# Patient Record
Sex: FEMALE | Race: WHITE | NOT HISPANIC OR LATINO | Employment: UNEMPLOYED | URBAN - METROPOLITAN AREA
[De-identification: names, ages, dates, MRNs, and addresses within clinical notes are randomized per-mention and may not be internally consistent; named-entity substitution may affect disease eponyms.]

---

## 2024-01-17 ENCOUNTER — PHARMACY VISIT (OUTPATIENT)
Dept: PHARMACY | Facility: CLINIC | Age: 34
End: 2024-01-17
Payer: MEDICAID

## 2024-01-17 ENCOUNTER — HOSPITAL ENCOUNTER (EMERGENCY)
Facility: HOSPITAL | Age: 34
Discharge: HOME | End: 2024-01-17
Attending: EMERGENCY MEDICINE
Payer: COMMERCIAL

## 2024-01-17 ENCOUNTER — APPOINTMENT (OUTPATIENT)
Dept: RADIOLOGY | Facility: HOSPITAL | Age: 34
End: 2024-01-17
Payer: COMMERCIAL

## 2024-01-17 VITALS
HEIGHT: 70 IN | RESPIRATION RATE: 16 BRPM | DIASTOLIC BLOOD PRESSURE: 130 MMHG | OXYGEN SATURATION: 93 % | WEIGHT: 293 LBS | SYSTOLIC BLOOD PRESSURE: 176 MMHG | TEMPERATURE: 97 F | BODY MASS INDEX: 41.95 KG/M2 | HEART RATE: 101 BPM

## 2024-01-17 DIAGNOSIS — M54.2 NECK PAIN: Primary | ICD-10-CM

## 2024-01-17 LAB
ANION GAP SERPL CALC-SCNC: 11 MMOL/L (ref 10–20)
BASOPHILS # BLD AUTO: 0.07 X10*3/UL (ref 0–0.1)
BASOPHILS NFR BLD AUTO: 0.8 %
BUN SERPL-MCNC: 9 MG/DL (ref 6–23)
CALCIUM SERPL-MCNC: 9.2 MG/DL (ref 8.6–10.3)
CHLORIDE SERPL-SCNC: 103 MMOL/L (ref 98–107)
CO2 SERPL-SCNC: 26 MMOL/L (ref 21–32)
CREAT SERPL-MCNC: 0.69 MG/DL (ref 0.5–1.05)
EGFRCR SERPLBLD CKD-EPI 2021: >90 ML/MIN/1.73M*2
EOSINOPHIL # BLD AUTO: 0.3 X10*3/UL (ref 0–0.7)
EOSINOPHIL NFR BLD AUTO: 3.4 %
ERYTHROCYTE [DISTWIDTH] IN BLOOD BY AUTOMATED COUNT: 12.3 % (ref 11.5–14.5)
GLUCOSE SERPL-MCNC: 102 MG/DL (ref 74–99)
HCG UR QL IA.RAPID: NEGATIVE
HCT VFR BLD AUTO: 43.9 % (ref 36–46)
HGB BLD-MCNC: 15.3 G/DL (ref 12–16)
IMM GRANULOCYTES # BLD AUTO: 0.05 X10*3/UL (ref 0–0.7)
IMM GRANULOCYTES NFR BLD AUTO: 0.6 % (ref 0–0.9)
LYMPHOCYTES # BLD AUTO: 2.16 X10*3/UL (ref 1.2–4.8)
LYMPHOCYTES NFR BLD AUTO: 24.8 %
MCH RBC QN AUTO: 31.7 PG (ref 26–34)
MCHC RBC AUTO-ENTMCNC: 34.9 G/DL (ref 32–36)
MCV RBC AUTO: 91 FL (ref 80–100)
MONOCYTES # BLD AUTO: 0.63 X10*3/UL (ref 0.1–1)
MONOCYTES NFR BLD AUTO: 7.2 %
NEUTROPHILS # BLD AUTO: 5.49 X10*3/UL (ref 1.2–7.7)
NEUTROPHILS NFR BLD AUTO: 63.2 %
NRBC BLD-RTO: 0 /100 WBCS (ref 0–0)
PLATELET # BLD AUTO: 318 X10*3/UL (ref 150–450)
POTASSIUM SERPL-SCNC: 4.3 MMOL/L (ref 3.5–5.3)
RBC # BLD AUTO: 4.82 X10*6/UL (ref 4–5.2)
SODIUM SERPL-SCNC: 136 MMOL/L (ref 136–145)
WBC # BLD AUTO: 8.7 X10*3/UL (ref 4.4–11.3)

## 2024-01-17 PROCEDURE — 99284 EMERGENCY DEPT VISIT MOD MDM: CPT | Performed by: EMERGENCY MEDICINE

## 2024-01-17 PROCEDURE — RXMED WILLOW AMBULATORY MEDICATION CHARGE

## 2024-01-17 PROCEDURE — 36415 COLL VENOUS BLD VENIPUNCTURE: CPT | Performed by: EMERGENCY MEDICINE

## 2024-01-17 PROCEDURE — 96374 THER/PROPH/DIAG INJ IV PUSH: CPT

## 2024-01-17 PROCEDURE — 72125 CT NECK SPINE W/O DYE: CPT

## 2024-01-17 PROCEDURE — 81025 URINE PREGNANCY TEST: CPT | Performed by: EMERGENCY MEDICINE

## 2024-01-17 PROCEDURE — 2500000004 HC RX 250 GENERAL PHARMACY W/ HCPCS (ALT 636 FOR OP/ED): Performed by: EMERGENCY MEDICINE

## 2024-01-17 PROCEDURE — 85025 COMPLETE CBC W/AUTO DIFF WBC: CPT | Performed by: EMERGENCY MEDICINE

## 2024-01-17 PROCEDURE — 80051 ELECTROLYTE PANEL: CPT | Performed by: EMERGENCY MEDICINE

## 2024-01-17 PROCEDURE — 72125 CT NECK SPINE W/O DYE: CPT | Performed by: RADIOLOGY

## 2024-01-17 RX ORDER — OXYCODONE AND ACETAMINOPHEN 5; 325 MG/1; MG/1
1 TABLET ORAL ONCE
Status: DISCONTINUED | OUTPATIENT
Start: 2024-01-17 | End: 2024-01-18 | Stop reason: HOSPADM

## 2024-01-17 RX ORDER — KETOROLAC TROMETHAMINE 30 MG/ML
15 INJECTION, SOLUTION INTRAMUSCULAR; INTRAVENOUS ONCE
Status: COMPLETED | OUTPATIENT
Start: 2024-01-17 | End: 2024-01-17

## 2024-01-17 RX ORDER — MORPHINE SULFATE 4 MG/ML
4 INJECTION, SOLUTION INTRAMUSCULAR; INTRAVENOUS ONCE
Status: DISCONTINUED | OUTPATIENT
Start: 2024-01-17 | End: 2024-01-17

## 2024-01-17 RX ORDER — CYCLOBENZAPRINE HCL 10 MG
10 TABLET ORAL 3 TIMES DAILY PRN
Qty: 15 TABLET | Refills: 0 | Status: SHIPPED | OUTPATIENT
Start: 2024-01-17 | End: 2024-06-07 | Stop reason: HOSPADM

## 2024-01-17 RX ORDER — ORPHENADRINE CITRATE 30 MG/ML
60 INJECTION INTRAMUSCULAR; INTRAVENOUS ONCE
Status: DISCONTINUED | OUTPATIENT
Start: 2024-01-17 | End: 2024-01-18 | Stop reason: HOSPADM

## 2024-01-17 RX ADMIN — KETOROLAC TROMETHAMINE 15 MG: 30 INJECTION, SOLUTION INTRAMUSCULAR at 19:20

## 2024-01-17 ASSESSMENT — PAIN - FUNCTIONAL ASSESSMENT
PAIN_FUNCTIONAL_ASSESSMENT: 0-10
PAIN_FUNCTIONAL_ASSESSMENT: 0-10

## 2024-01-17 ASSESSMENT — PAIN DESCRIPTION - PROGRESSION: CLINICAL_PROGRESSION: NOT CHANGED

## 2024-01-17 ASSESSMENT — LIFESTYLE VARIABLES
HAVE YOU EVER FELT YOU SHOULD CUT DOWN ON YOUR DRINKING: NO
REASON UNABLE TO ASSESS: NO
HAVE PEOPLE ANNOYED YOU BY CRITICIZING YOUR DRINKING: NO
EVER HAD A DRINK FIRST THING IN THE MORNING TO STEADY YOUR NERVES TO GET RID OF A HANGOVER: NO
EVER FELT BAD OR GUILTY ABOUT YOUR DRINKING: NO

## 2024-01-17 ASSESSMENT — PAIN DESCRIPTION - LOCATION: LOCATION: NECK

## 2024-01-17 ASSESSMENT — PAIN DESCRIPTION - DESCRIPTORS: DESCRIPTORS: SHARP

## 2024-01-17 ASSESSMENT — PAIN SCALES - GENERAL
PAINLEVEL_OUTOF10: 9
PAINLEVEL_OUTOF10: 9

## 2024-01-17 NOTE — ED PROVIDER NOTES
HPI   Chief Complaint   Patient presents with    Neck Pain     X 4 days no known  injury       HPI       HISTORY OF PRESENT ILLNESS:  33-year-old female presents emergency department for neck pain.  History is significant for prior thoracic outlet syndrome with neck surgery to remove the extra rib.  Patient states that 4 days ago, while she was sitting down, she had sudden onset of neck pain in the right side of her neck.  She noted today that started to radiate a down the lateral humerus region.  Associated with right-sided headache.  The patient denies any current chest pain, nausea, vomiting,    Denied any numbness, tingling, arm weakness but is painful to move her right arm.    Past Medical History: Thoracic outlet syndrome  Past Surgical History: Thoracic outlet syndrome surgery   Family History: family history not pertinent to presenting problem or chief complaint  Social History:  denies cigarette smoking or EtOH use    __________________________________________________________  PHYSICAL EXAM:    Appearance: Appears stated age  female, alert oriented , cooperative   Skin: Intact,  dry skin, no lesions, rash, petechiae or purpura.   Eyes: PERRLA, EOMs intact,  Conjunctiva pink with no redness or exudates.    HENT: Normocephalic, atraumatic. Nares patent   Neck: Supple. Trachea at midline.   Pulmonary: Lung sounds are clear bilaterally.  There is no rales, rhonchi, or wheezing.  Cardiac: Regular rate and rhythm, no rubs, murmurs, or gallops. No JVD,   Abdomen: Abdomen is soft, nontender, and nondistended.  No palpable organomegaly.  No rebound or guarding.  No CVA tenderness. Nonsurgical abdomen  Genitourinary: Exam deferred.  Musculoskeletal: There is some midline C-spine tenderness and the mid C-spine region.  R trapezius also tender to superficial touch. No step-off.  No edema, pain, cyanosis, or deformity in extremities. Pulses full and equal.   Neurological:  Cranial nerves are grossly intact,  grossly normal sensation, no weakness, no focal findings identified.  Patient did not have any numbness with a upper or lower extremity.  Motor was intact.    __________________________________________________________  MEDICAL DECISION MAKING:    Patient was seen and examined. Differential diagnosis for right-sided neck pain includes musculoskeletal strain, surgical complication, occult fracture or dislocation.  Patient has a history significant for a thoracic outlet syndrome status post surgery.  She has had 4 days of atraumatic right-sided neck pain.  The patient denies any manipulation of the neck or trauma.  On physical exam, she is tender on palpation to musculature.  She denied any fever, nausea or vomiting.  There was an associated headache which was not thunderclap in nature.  Based on her presentation, I did not believe that this was a meningitis.  Given the onset of pain, I will obtain a CT scan in addition to basic labs.  Patient vital signs did not suggest signs of infection consistent with a meningitis.    Patient labs show no evidence of leukocytosis concerning for infection.  CT imaging was obtained which was negative for fracture or dislocation.  There was x-ray sinus fluid, which the patient states she has a history of sinusitis and that is not currently active at this time requiring treatment.  She did refuse my initial dose of muscle relaxant in addition to pain medication.  I ultimately did give her a dose of Toradol.  The patient felt comfortable at this point was discharged with return precautions for any neurodeficits or weakness, follow-up with her surgeon, and supportive care instructions.  Patient was provided prescription of muscle relaxants.  She verbalized understanding, agreed to plan the patient was discharged home.    Chronic Medical Conditions Significantly Affecting Care: Thoracic outlet syndrome    Emile Barajas  Emergency Medicine               [place] Coma Scale Score: 15                   Patient History   No past medical history on file.  Past Surgical History:   Procedure Laterality Date    ADENOIDECTOMY  06/30/2016    Adenoidectomy    OTHER SURGICAL HISTORY  06/30/2016    Abdominal Surgery    TONSILLECTOMY  06/30/2016    Tonsillectomy     No family history on file.  Social History     Tobacco Use    Smoking status: Not on file    Smokeless tobacco: Not on file   Substance Use Topics    Alcohol use: Not on file    Drug use: Not on file       Physical Exam   ED Triage Vitals [01/17/24 1729]   Temp Heart Rate Resp BP   36.1 °C (97 °F) 101 16 (!) 176/130      SpO2 Temp src Heart Rate Source Patient Position   93 % -- Monitor Sitting      BP Location FiO2 (%)     Left arm --       Physical Exam    ED Course & MDM   Diagnoses as of 01/18/24 0145   Neck pain       Medical Decision Making      Procedure  Procedures     Emile Barajas,   01/18/24 0155

## 2024-02-13 ENCOUNTER — LAB (OUTPATIENT)
Dept: LAB | Facility: LAB | Age: 34
End: 2024-02-13
Payer: COMMERCIAL

## 2024-02-13 ENCOUNTER — OFFICE VISIT (OUTPATIENT)
Dept: PRIMARY CARE | Facility: CLINIC | Age: 34
End: 2024-02-13
Payer: COMMERCIAL

## 2024-02-13 ENCOUNTER — OFFICE VISIT (OUTPATIENT)
Dept: OBSTETRICS AND GYNECOLOGY | Facility: CLINIC | Age: 34
End: 2024-02-13
Payer: COMMERCIAL

## 2024-02-13 VITALS
DIASTOLIC BLOOD PRESSURE: 100 MMHG | HEART RATE: 89 BPM | TEMPERATURE: 98.2 F | SYSTOLIC BLOOD PRESSURE: 134 MMHG | HEIGHT: 70 IN | BODY MASS INDEX: 41.95 KG/M2 | WEIGHT: 293 LBS | OXYGEN SATURATION: 98 %

## 2024-02-13 VITALS
BODY MASS INDEX: 41.02 KG/M2 | WEIGHT: 293 LBS | DIASTOLIC BLOOD PRESSURE: 88 MMHG | SYSTOLIC BLOOD PRESSURE: 152 MMHG | HEIGHT: 71 IN

## 2024-02-13 DIAGNOSIS — N92.0 MENORRHAGIA WITH REGULAR CYCLE: ICD-10-CM

## 2024-02-13 DIAGNOSIS — R63.5 WEIGHT GAIN: ICD-10-CM

## 2024-02-13 DIAGNOSIS — R03.0 ELEVATED BLOOD PRESSURE READING: ICD-10-CM

## 2024-02-13 DIAGNOSIS — N94.6 DYSMENORRHEA: ICD-10-CM

## 2024-02-13 DIAGNOSIS — R63.5 WEIGHT GAIN: Primary | ICD-10-CM

## 2024-02-13 DIAGNOSIS — R10.2 PELVIC PAIN: Primary | ICD-10-CM

## 2024-02-13 PROBLEM — R00.0 TACHYCARDIA: Status: ACTIVE | Noted: 2021-08-19

## 2024-02-13 PROBLEM — G54.0: Status: RESOLVED | Noted: 2022-07-05 | Resolved: 2024-02-13

## 2024-02-13 PROBLEM — N80.9 ENDOMETRIOSIS: Status: ACTIVE | Noted: 2021-08-19

## 2024-02-13 PROBLEM — G43.009 MIGRAINE WITHOUT AURA AND WITHOUT STATUS MIGRAINOSUS, NOT INTRACTABLE: Status: ACTIVE | Noted: 2021-10-13

## 2024-02-13 PROBLEM — N60.19 FIBROCYSTIC BREAST CHANGES: Status: ACTIVE | Noted: 2021-08-19

## 2024-02-13 PROBLEM — R55 SYNCOPE AND COLLAPSE: Status: RESOLVED | Noted: 2021-07-27 | Resolved: 2024-02-13

## 2024-02-13 PROBLEM — M54.2 NECK PAIN ON RIGHT SIDE: Status: ACTIVE | Noted: 2021-10-14

## 2024-02-13 PROBLEM — I77.89: Status: RESOLVED | Noted: 2022-07-05 | Resolved: 2024-02-13

## 2024-02-13 LAB
ALBUMIN SERPL BCP-MCNC: 4.3 G/DL (ref 3.4–5)
ALP SERPL-CCNC: 62 U/L (ref 33–110)
ALT SERPL W P-5'-P-CCNC: 15 U/L (ref 7–45)
ANION GAP SERPL CALC-SCNC: 10 MMOL/L (ref 10–20)
AST SERPL W P-5'-P-CCNC: 13 U/L (ref 9–39)
BILIRUB SERPL-MCNC: 0.3 MG/DL (ref 0–1.2)
BUN SERPL-MCNC: 11 MG/DL (ref 6–23)
CALCIUM SERPL-MCNC: 9 MG/DL (ref 8.6–10.3)
CHLORIDE SERPL-SCNC: 106 MMOL/L (ref 98–107)
CHOLEST SERPL-MCNC: 184 MG/DL (ref 0–199)
CHOLESTEROL/HDL RATIO: 3.5
CO2 SERPL-SCNC: 26 MMOL/L (ref 21–32)
CREAT SERPL-MCNC: 0.73 MG/DL (ref 0.5–1.05)
EGFRCR SERPLBLD CKD-EPI 2021: >90 ML/MIN/1.73M*2
EST. AVERAGE GLUCOSE BLD GHB EST-MCNC: 105 MG/DL
GLUCOSE SERPL-MCNC: 98 MG/DL (ref 74–99)
HBA1C MFR BLD: 5.3 %
HDLC SERPL-MCNC: 52.1 MG/DL
LDLC SERPL CALC-MCNC: 101 MG/DL
NON HDL CHOLESTEROL: 132 MG/DL (ref 0–149)
POTASSIUM SERPL-SCNC: 4.4 MMOL/L (ref 3.5–5.3)
PROT SERPL-MCNC: 6.9 G/DL (ref 6.4–8.2)
SODIUM SERPL-SCNC: 138 MMOL/L (ref 136–145)
T4 FREE SERPL-MCNC: 0.82 NG/DL (ref 0.61–1.12)
TRIGL SERPL-MCNC: 156 MG/DL (ref 0–149)
TSH SERPL-ACNC: 4.97 MIU/L (ref 0.44–3.98)
VLDL: 31 MG/DL (ref 0–40)

## 2024-02-13 PROCEDURE — 99203 OFFICE O/P NEW LOW 30 MIN: CPT | Performed by: NURSE PRACTITIONER

## 2024-02-13 PROCEDURE — 80053 COMPREHEN METABOLIC PANEL: CPT

## 2024-02-13 PROCEDURE — 84439 ASSAY OF FREE THYROXINE: CPT

## 2024-02-13 PROCEDURE — 3008F BODY MASS INDEX DOCD: CPT | Performed by: NURSE PRACTITIONER

## 2024-02-13 PROCEDURE — 83036 HEMOGLOBIN GLYCOSYLATED A1C: CPT

## 2024-02-13 PROCEDURE — 80061 LIPID PANEL: CPT

## 2024-02-13 PROCEDURE — 36415 COLL VENOUS BLD VENIPUNCTURE: CPT

## 2024-02-13 PROCEDURE — 84443 ASSAY THYROID STIM HORMONE: CPT

## 2024-02-13 RX ORDER — BISMUTH SUBSALICYLATE 262 MG
2 TABLET,CHEWABLE ORAL DAILY
COMMUNITY

## 2024-02-13 ASSESSMENT — ENCOUNTER SYMPTOMS
DIFFICULTY URINATING: 1
DIARRHEA: 1
COUGH: 0
DIFFICULTY URINATING: 0
EYE DISCHARGE: 0
DYSURIA: 0
SINUS PRESSURE: 0
NERVOUS/ANXIOUS: 0
ADENOPATHY: 0
DIZZINESS: 0
FATIGUE: 1
BACK PAIN: 1
EYE ITCHING: 1
NAUSEA: 0
ABDOMINAL PAIN: 0
PALPITATIONS: 0
NUMBNESS: 0
CONSTIPATION: 1
CHILLS: 0
POLYDIPSIA: 0
CONSTITUTIONAL NEGATIVE: 1
HEADACHES: 1
NECK PAIN: 1
BRUISES/BLEEDS EASILY: 1
RESPIRATORY NEGATIVE: 1
SHORTNESS OF BREATH: 0
HEMATURIA: 0
WEAKNESS: 0
EYE PAIN: 0
VOMITING: 0
FREQUENCY: 0
PHOTOPHOBIA: 0
EYE REDNESS: 0
SORE THROAT: 0
WHEEZING: 0
ARTHRALGIAS: 0
UNEXPECTED WEIGHT CHANGE: 0
RHINORRHEA: 0
FEVER: 0
SPEECH DIFFICULTY: 0
POLYPHAGIA: 0
SLEEP DISTURBANCE: 0
DYSPHORIC MOOD: 0
CHEST TIGHTNESS: 0
BLOOD IN STOOL: 0
PSYCHIATRIC NEGATIVE: 1
MYALGIAS: 1

## 2024-02-13 ASSESSMENT — PATIENT HEALTH QUESTIONNAIRE - PHQ9
SUM OF ALL RESPONSES TO PHQ9 QUESTIONS 1 AND 2: 0
1. LITTLE INTEREST OR PLEASURE IN DOING THINGS: NOT AT ALL
2. FEELING DOWN, DEPRESSED OR HOPELESS: NOT AT ALL

## 2024-02-13 NOTE — PROGRESS NOTES
Subjective   Patient ID: Tracie Malagon is a 33 y.o. female who presents for New Patient Visit (Patient states she has a large fibroid that was first found on ultrasound 6/10/2021. States it had doubled in size in a year and a half. States she is having issues voiding. Heavier, painful periods ).  33 year old here for complaints of having heavy and painful periods as well as ongoing pressure.  She states that she has had heavy and painful periods for years and was informed she had a fibroid in her uterus in 2021.  She then had the fibroid rechecked in March 2023.  That report states uterine fibroid present which appears to have increased in size, but no measurements were provided on the typed report.  Per patient the fibroid doubled in size from 2021 to 2023.  She states that she has continued to have heavy and painful periods.  She is now filling her menstrual cup every hour and passing clots quarter size to a golf ball size every time she changes her cup.  She is also wearing pads to help with overflow.  She works from home, but states she would be unable to leave while she is on her menses due to the heaviness.  She also is having extreme cramping with her cycle.  She has also started to notice pain and difficulty with urination.  She feels she has to strain greatly to be able to urinate despite having the urge to go.  She states once she pushes hard enough the urine does come out.  She denies any burning or pain with urination.  She states that she is also feeling like she has something in her stomach at all times like she is carrying a baby without being pregnant.  She feels the pressure when she is sitting or bends over.  She states that she does not want to feel like this every day and has come to terms with not having children.          Review of Systems   Constitutional: Negative.    Respiratory: Negative.     Genitourinary:  Positive for difficulty urinating, pelvic pain and vaginal bleeding.   Skin:  Negative.    Psychiatric/Behavioral: Negative.         Objective   Physical Exam  Vitals reviewed.   Constitutional:       Appearance: Normal appearance. She is well-developed.   Pulmonary:      Effort: Pulmonary effort is normal. No respiratory distress.   Abdominal:      Palpations: Abdomen is soft.   Genitourinary:     Comments: Patient declined pelvic at this time  Musculoskeletal:         General: Normal range of motion.   Skin:     General: Skin is warm and dry.   Neurological:      General: No focal deficit present.      Mental Status: She is alert and oriented to person, place, and time. Mental status is at baseline.   Psychiatric:         Attention and Perception: Attention and perception normal.         Mood and Affect: Mood and affect normal.         Speech: Speech normal.         Behavior: Behavior normal. Behavior is cooperative.         Thought Content: Thought content normal.         Judgment: Judgment normal.         Assessment/Plan   Problem List Items Addressed This Visit             ICD-10-CM    Menorrhagia with regular cycle N92.0    Relevant Orders    US PELVIS TRANSABDOMINAL WITH TRANSVAGINAL    Dysmenorrhea N94.6     Other Visit Diagnoses         Codes    Pelvic pain    -  Primary R10.2    Relevant Orders    US PELVIS TRANSABDOMINAL WITH TRANSVAGINAL          Pelvic ultrasound ordered, reviewed pending results can refer to surgeon for intervention  Records release to obtain records from previous ob/gyn  Return for annual exam/pap       CATA Husain-CNP 02/13/24 5:18 PM

## 2024-02-13 NOTE — PATIENT INSTRUCTIONS
Obtain the fasting labs as ordered today.  Check home BP readings.   Referral to GYN.  Follow up in 3 months to recheck BP.

## 2024-02-13 NOTE — PROGRESS NOTES
Subjective    Tracie Malagon is a 33 y.o. female who presents for New Patient Visit (Establish care with Laura - Wants to talk about her concern with her periods and wants to talk about family history).    HPI  She presents to the office today to establish care, discuss weight gain, fibroids.  Work 3pm-midnight  Wakes up at noon  Diet: Breakfast around noon Coffee- sugar free oat milk.   Yogurt- generally eats a light breakfast or gets nauseated.  Snack- carrots  Lunchat 7 pm: low carb wrap with tuna.    Exercise: Cardio- treadmill walk/run 2 miles  Bikes about 30 minutes and weightlifting  Generally goes 5 days a week    Has gained 36 lbs in the past 6 weeks      Periods are regular occurring every 28 days  Duration every 5-9 day  Cramps and heavy bleeding.  (+) pain in the lower back and lower abdomen.  Uses a menstrual cup- on first 3 days it fills with blood and clots every 45 minutes  (+) quarter sized clots  Has known fibroid which has grown in size and bleeding is heavier now.       Review of Systems   Constitutional:  Positive for fatigue. Negative for chills, fever and unexpected weight change.   HENT:  Positive for hearing loss, nosebleeds and postnasal drip. Negative for congestion, ear pain, rhinorrhea, sinus pressure, sore throat and tinnitus.    Eyes:  Positive for itching. Negative for photophobia, pain, discharge, redness and visual disturbance.   Respiratory:  Negative for cough, chest tightness, shortness of breath and wheezing.    Cardiovascular:  Negative for chest pain, palpitations and leg swelling.   Gastrointestinal:  Positive for constipation and diarrhea. Negative for abdominal pain, blood in stool, nausea and vomiting.   Endocrine: Negative for cold intolerance, heat intolerance, polydipsia, polyphagia and polyuria.   Genitourinary:  Negative for difficulty urinating, dysuria, frequency, hematuria and urgency.   Musculoskeletal:  Positive for back pain, myalgias and neck pain.  "Negative for arthralgias.        Constipated until around period   Skin:  Negative for rash.   Neurological:  Positive for headaches. Negative for dizziness, syncope, speech difficulty, weakness and numbness.   Hematological:  Negative for adenopathy. Bruises/bleeds easily.   Psychiatric/Behavioral:  Negative for dysphoric mood and sleep disturbance. The patient is not nervous/anxious.        Objective   BP (!) 134/100 (BP Location: Left arm, Patient Position: Sitting)   Pulse 89   Temp 36.8 °C (98.2 °F) (Temporal)   Ht 1.77 m (5' 9.69\")   Wt (!) 153 kg (336 lb 6.4 oz)   SpO2 98%   BMI 48.71 kg/m²     Physical Exam  Constitutional:       General: She is not in acute distress.     Appearance: Normal appearance. She is not toxic-appearing.   Eyes:      Extraocular Movements: Extraocular movements intact.      Conjunctiva/sclera: Conjunctivae normal.      Pupils: Pupils are equal, round, and reactive to light.   Neck:      Vascular: No carotid bruit.   Cardiovascular:      Rate and Rhythm: Normal rate and regular rhythm.      Pulses: Normal pulses.      Heart sounds: Normal heart sounds, S1 normal and S2 normal. No murmur heard.  Pulmonary:      Effort: Pulmonary effort is normal. No respiratory distress.      Breath sounds: Normal breath sounds.   Abdominal:      General: Bowel sounds are normal.      Palpations: Abdomen is soft.      Tenderness: There is no abdominal tenderness.   Musculoskeletal:      Right lower leg: No edema.      Left lower leg: No edema.   Lymphadenopathy:      Cervical: No cervical adenopathy.   Neurological:      Mental Status: She is alert and oriented to person, place, and time.   Psychiatric:         Attention and Perception: Attention normal.         Mood and Affect: Mood and affect normal.         Behavior: Behavior normal. Behavior is cooperative.         Thought Content: Thought content normal.         Cognition and Memory: Cognition normal.         Judgment: Judgment normal. "         Assessment/Plan   Problem List Items Addressed This Visit       Weight gain - Primary    Relevant Orders    TSH with reflex to Free T4 if abnormal (Completed)    Menorrhagia with regular cycle    Relevant Orders    Comprehensive Metabolic Panel (Completed)    Lipid Panel (Completed)    TSH with reflex to Free T4 if abnormal (Completed)    Referral to Gynecology    BMI 45.0-49.9, adult (CMS/[facility])    Relevant Orders    Hemoglobin A1C (Completed)    Elevated blood pressure reading     Check home BP readings.   Follow up in 3 months to recheck BP along with home BP cuff..             It has been a pleasure seeing you today!

## 2024-02-22 DIAGNOSIS — R79.89 ELEVATED TSH: ICD-10-CM

## 2024-02-22 DIAGNOSIS — R63.5 WEIGHT GAIN: Primary | ICD-10-CM

## 2024-02-28 ENCOUNTER — HOSPITAL ENCOUNTER (OUTPATIENT)
Dept: RADIOLOGY | Facility: CLINIC | Age: 34
Discharge: HOME | End: 2024-02-28
Payer: COMMERCIAL

## 2024-02-28 ENCOUNTER — APPOINTMENT (OUTPATIENT)
Dept: RADIOLOGY | Facility: HOSPITAL | Age: 34
End: 2024-02-28
Payer: COMMERCIAL

## 2024-02-28 ENCOUNTER — HOSPITAL ENCOUNTER (EMERGENCY)
Facility: HOSPITAL | Age: 34
Discharge: HOME | End: 2024-02-29
Payer: COMMERCIAL

## 2024-02-28 DIAGNOSIS — M25.561 ACUTE PAIN OF RIGHT KNEE: ICD-10-CM

## 2024-02-28 DIAGNOSIS — S80.01XA PATELLAR CONTUSION, RIGHT, INITIAL ENCOUNTER: Primary | ICD-10-CM

## 2024-02-28 DIAGNOSIS — R10.2 PELVIC PAIN: ICD-10-CM

## 2024-02-28 DIAGNOSIS — N92.0 MENORRHAGIA WITH REGULAR CYCLE: ICD-10-CM

## 2024-02-28 PROCEDURE — 73564 X-RAY EXAM KNEE 4 OR MORE: CPT | Mod: RIGHT SIDE | Performed by: SURGERY

## 2024-02-28 PROCEDURE — 76856 US EXAM PELVIC COMPLETE: CPT | Performed by: RADIOLOGY

## 2024-02-28 PROCEDURE — 99283 EMERGENCY DEPT VISIT LOW MDM: CPT | Mod: 25

## 2024-02-28 PROCEDURE — 76830 TRANSVAGINAL US NON-OB: CPT | Performed by: RADIOLOGY

## 2024-02-28 PROCEDURE — 73564 X-RAY EXAM KNEE 4 OR MORE: CPT | Mod: RT

## 2024-02-28 PROCEDURE — 76830 TRANSVAGINAL US NON-OB: CPT

## 2024-02-28 RX ORDER — KETOROLAC TROMETHAMINE 30 MG/ML
30 INJECTION, SOLUTION INTRAMUSCULAR; INTRAVENOUS ONCE
Status: COMPLETED | OUTPATIENT
Start: 2024-02-28 | End: 2024-02-29

## 2024-02-28 RX ORDER — BACITRACIN ZINC 500 UNIT/G
OINTMENT IN PACKET (EA) TOPICAL ONCE
Status: COMPLETED | OUTPATIENT
Start: 2024-02-28 | End: 2024-02-29

## 2024-02-28 ASSESSMENT — PAIN - FUNCTIONAL ASSESSMENT: PAIN_FUNCTIONAL_ASSESSMENT: 0-10

## 2024-02-28 ASSESSMENT — COLUMBIA-SUICIDE SEVERITY RATING SCALE - C-SSRS
6. HAVE YOU EVER DONE ANYTHING, STARTED TO DO ANYTHING, OR PREPARED TO DO ANYTHING TO END YOUR LIFE?: NO
1. IN THE PAST MONTH, HAVE YOU WISHED YOU WERE DEAD OR WISHED YOU COULD GO TO SLEEP AND NOT WAKE UP?: NO
2. HAVE YOU ACTUALLY HAD ANY THOUGHTS OF KILLING YOURSELF?: NO

## 2024-02-28 ASSESSMENT — PAIN SCALES - GENERAL: PAINLEVEL_OUTOF10: 10 - WORST POSSIBLE PAIN

## 2024-02-28 ASSESSMENT — PAIN DESCRIPTION - FREQUENCY: FREQUENCY: CONSTANT/CONTINUOUS

## 2024-02-28 ASSESSMENT — PAIN DESCRIPTION - ORIENTATION: ORIENTATION: RIGHT

## 2024-02-28 ASSESSMENT — PAIN DESCRIPTION - LOCATION: LOCATION: KNEE

## 2024-02-29 ENCOUNTER — TELEPHONE (OUTPATIENT)
Dept: OBSTETRICS AND GYNECOLOGY | Facility: CLINIC | Age: 34
End: 2024-02-29
Payer: COMMERCIAL

## 2024-02-29 ENCOUNTER — APPOINTMENT (OUTPATIENT)
Dept: RADIOLOGY | Facility: HOSPITAL | Age: 34
End: 2024-02-29
Payer: COMMERCIAL

## 2024-02-29 VITALS
BODY MASS INDEX: 41.02 KG/M2 | HEIGHT: 71 IN | OXYGEN SATURATION: 99 % | HEART RATE: 80 BPM | WEIGHT: 293 LBS | RESPIRATION RATE: 16 BRPM | TEMPERATURE: 97.7 F | DIASTOLIC BLOOD PRESSURE: 89 MMHG | SYSTOLIC BLOOD PRESSURE: 128 MMHG

## 2024-02-29 PROCEDURE — 73560 X-RAY EXAM OF KNEE 1 OR 2: CPT | Mod: RIGHT SIDE | Performed by: RADIOLOGY

## 2024-02-29 PROCEDURE — 73560 X-RAY EXAM OF KNEE 1 OR 2: CPT | Mod: RT

## 2024-02-29 PROCEDURE — 2500000001 HC RX 250 WO HCPCS SELF ADMINISTERED DRUGS (ALT 637 FOR MEDICARE OP): Performed by: NURSE PRACTITIONER

## 2024-02-29 PROCEDURE — 2500000004 HC RX 250 GENERAL PHARMACY W/ HCPCS (ALT 636 FOR OP/ED): Performed by: NURSE PRACTITIONER

## 2024-02-29 PROCEDURE — 96372 THER/PROPH/DIAG INJ SC/IM: CPT

## 2024-02-29 RX ORDER — NAPROXEN 500 MG/1
500 TABLET ORAL
Qty: 14 TABLET | Refills: 0 | Status: SHIPPED | OUTPATIENT
Start: 2024-02-29 | End: 2024-03-07

## 2024-02-29 RX ADMIN — BACITRACIN 1 APPLICATION: 500 OINTMENT TOPICAL at 00:10

## 2024-02-29 RX ADMIN — KETOROLAC TROMETHAMINE 30 MG: 30 INJECTION, SOLUTION INTRAMUSCULAR at 00:10

## 2024-02-29 ASSESSMENT — VISUAL ACUITY: OU: 1
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
## 2024-02-29 NOTE — TELEPHONE ENCOUNTER
Patient was informed of the following.   I informed patient that you would contact her tomorrow to assist her in getting scheduled for a surgical intervention.

## 2024-02-29 NOTE — DISCHARGE INSTRUCTIONS
[facility]  Rest the affected extremity   Ice the affected area for 20 minutes every 4 hours  Compress the area with the supplies provided  Elevate the affected extremity above the heart whenever possible

## 2024-02-29 NOTE — TELEPHONE ENCOUNTER
----- Message from LEONIE Husain sent at 2/29/2024  4:49 PM EST -----  Please inform patient that her ultrasound returned showing the fibroid to be 6cm.  We have the option to return with a physician for surgical intervention or have and IUD placed if desired to help control the bleeding.

## 2024-03-19 ENCOUNTER — APPOINTMENT (OUTPATIENT)
Dept: OBSTETRICS AND GYNECOLOGY | Facility: CLINIC | Age: 34
End: 2024-03-19
Payer: COMMERCIAL

## 2024-03-21 PROBLEM — D25.1 INTRAMURAL LEIOMYOMA OF UTERUS: Status: ACTIVE | Noted: 2024-03-21

## 2024-03-21 PROBLEM — Z01.411 ENCOUNTER FOR WELL WOMAN EXAM WITH ABNORMAL FINDINGS: Status: ACTIVE | Noted: 2024-03-21

## 2024-03-21 NOTE — PROGRESS NOTES
Subjective   Patient ID: Tracie Malagon is a 34 y.o. female who presents for No chief complaint on file..  She presents for gyn follow up visit. She was seen as a new patent by Freida Francis on 2/13/2024. She notes a history of fibroid with documented increased size. She has excessive menses with clots and cramps. She notes heavy menses monthly with flow lasting 7-10 days. She is using a menstrual cup and needs to empty this hourly on her heavy days. She passes clots with severe cramping.   She has tried combined OCP and contraceptive patch with emotional lability with severe anger and crying. She continued with breakthrough bleeding when she tried extended cycle OCP. She does not wish to try Nexplanon since her sister did very poorly with this. She is not comfortable with idea of progestin releasing IUD. Ibuprofen helps decrease bleeding very mildly. She has never been pregnant but no longer plans to have children and she is looking for a permanent fix to her heavy menses.     She is also noting difficulty emptying her bladder. She feels the need to push to void. This seems to occur during menses and ovulation.    Chart was reviewed with note of recent 35 pound weight gain, elevated TSH with normal free T4. She is noting fatigue.  She has lost 182 pounds over 4 years with diet changes and exercising.  During the past 4 months she has started to gain weight again.    Last annual gyn exam was done on 10/10/2022. She has a  history of dysplasia treated by LEEP 11/30/2021 with LANDEN 2-3 on final pathology. No pap is found in the EMR after this date.     Pelvic ultrasound was performed on 2/28/2024:  UTERUS:  Size:  7.9 x 7.4 x 7.6 cm  Masses: 6.0 x 5.5 x 5.3 cm presumed fibroid in central uterus.  Endometrial thickness: 3 mm.  Ovaries had a normal appearance.   Prior ultrasound was reviewed from 3/2023:  Uterus was enlarged measuring  L:  9.64       W:  7.08       H:  6.83 with central fibroid measuring 5.04      5.42       4.68 cm.      She also reports a strong of breast cancer in grandmother, several cousins and a male uncle. After discussion she desires referral to  breast center.          Review of Systems   Constitutional:  Negative for activity change.   HENT:  Negative for congestion.    Respiratory:  Negative for apnea and cough.    Cardiovascular:  Negative for chest pain.   Gastrointestinal:  Negative for constipation and diarrhea.   Genitourinary:  Negative for hematuria and vaginal pain.   Musculoskeletal:  Negative for joint swelling.   Neurological:  Negative for dizziness.   Psychiatric/Behavioral:  Negative for agitation.        Past Medical History:   Diagnosis Date    Abnormal Pap smear of cervix     ADHD (attention deficit hyperactivity disorder)     Allergic     Anxiety     Arterial thoracic outlet syndrome of right subclavian artery 07/05/2022    Endometriosis     Fibroid     Headache     Migraine     SOB (shortness of breath)     STD (female)     Syncope and collapse 07/27/2021    Urinary tract infection       Past Surgical History:   Procedure Laterality Date    ADENOIDECTOMY  06/30/2016    Adenoidectomy    CERVICAL BIOPSY  W/ LOOP ELECTRODE EXCISION      CONDYLOMA EXCISION/FULGURATION      LAPAROSCOPY DIAGNOSTIC / BIOPSY / ASPIRATION / LYSIS      TONSILLECTOMY  06/30/2016    Tonsillectomy      Allergies   Allergen Reactions    Sulfa (Sulfonamide Antibiotics) Anaphylaxis      Current Outpatient Medications on File Prior to Visit   Medication Sig Dispense Refill    cyclobenzaprine (Flexeril) 10 mg tablet Take 1 tablet (10 mg) by mouth 3 times a day as needed for muscle spasms for up to 5 days. 15 tablet 0    multivitamin tablet Take 2 tablets by mouth once daily.      psyllium seed, with sugar, (FIBER ORAL) Take by mouth once daily.       No current facility-administered medications on file prior to visit.        Objective   Physical Exam  Constitutional:       Appearance: Normal appearance. She is obese.    Cardiovascular:      Rate and Rhythm: Normal rate and regular rhythm.   Pulmonary:      Effort: Pulmonary effort is normal.      Breath sounds: Normal breath sounds.   Abdominal:      Palpations: Abdomen is soft. There is no mass.      Tenderness: There is no abdominal tenderness.      Hernia: No hernia is present.   Genitourinary:     General: Normal vulva.      Exam position: Lithotomy position.      Labia:         Right: No lesion.         Left: No lesion.       Urethra: No urethral lesion.      Vagina: Normal. No lesions or prolapsed vaginal walls.      Cervix: No lesion.      Uterus: Enlarged. Not tender and no uterine prolapse.       Adnexa: Right adnexa normal and left adnexa normal.        Right: No mass or tenderness.          Left: No mass or tenderness.        Comments: Globular uterus with excellent support.  Skin:     General: Skin is warm and dry.   Neurological:      Mental Status: She is alert.     Patient ID: Tracie Malagon is a 34 y.o. female.    Endometrial biopsy    Date/Time: 3/26/2024 11:27 AM    Performed by: Gladys Lovett MD  Authorized by: Gladys Lovett MD    Consent:     Consent obtained: written    Consent given by: patient    Risks discussed: bleeding    Alternatives discussed: observation, alternative treatment and referral    Patient agrees, verbalizes understanding, and wants to proceed: yes    Universal protocol:     Test results available and properly labeled: yes      Relevant documents present and verified: yes      Immediately prior to procedure a time out was called: yes      Patient identity confirmed: verbally with patient  Indications:     Indications: abnormal uterine bleeding    Pre-procedure:     Urine pregnancy test: negative    Procedure:     A bimanual exam was performed: yes      Uterus size: 9-10 weeks    Uterus position: midposition    Prepped with: Betadine    Tenaculum used: yes      A local block was performed: no      Local anesthetic: none     Cervix dilated: no      Number of passes: 1  Findings:     Cervix: normal      Specimen collected: specimen collected and sent to pathology      Patient tolerance: tolerated well, no immediate complications        Problem List Items Addressed This Visit       Menorrhagia with regular cycle    Overview     Heavy monthly menses with history of uterine fibroid. Bleeding has not improved significantly with ibuprofen, combined OCP, contraceptive patch and extended cycle OCP. She is not interested in progestin releasing IUD or lysteda due to concerns for blood clot formation. Given size and location of fibroid, she is not a good candidate for endometrial ablation.         Current Assessment & Plan     Endometrial biopsy was sent today. Will contact her with results.  After discussion she desires definitive treatment with hysterectomy. She understands that her BMI, size and shape of uterus with fibroid, and no prior pregnancy does increase difficulty of surgery risks of complication.          Relevant Orders    POCT pregnancy, urine manually resulted (Completed)    Surgical Pathology Exam    Endometrial biopsy    Intramural leiomyoma of uterus    Overview     Heavy menses with clots and cramps. She also notes pressure symptoms and some difficulty voiding.   Pelvic ultrasound 2/28/2024 measured uterus at 7.9 x 7.4 x 7.6 cm with a central fibroid measuring 6.0 x 5.5 x 5.3 cm. Endometrial thickness: 3 mm.  This imaging documents some increased growth of the fibroid from prior imaging in 2023.          Current Assessment & Plan     She desires definitive treatment with hysterectomy.         Relevant Orders    Endometrial biopsy    Encounter for well woman exam with abnormal findings - Primary    Overview     11/30/21 leep for hgsil   11/1/21 colpo hgsil  9/20/21 lgsil cannot rule out hgsil  No follow up pap is seen after LEEP.         Current Assessment & Plan     Pap was sent with HPV.   Regular exercise and  attaining/maintaining a healthy weight is encouraged.   Adequate calcium intake with diet or supplements is encouraged.    We will notify of any abnormal results.          Relevant Orders    THINPREP PAP

## 2024-03-26 ENCOUNTER — OFFICE VISIT (OUTPATIENT)
Dept: OBSTETRICS AND GYNECOLOGY | Facility: CLINIC | Age: 34
End: 2024-03-26
Payer: COMMERCIAL

## 2024-03-26 VITALS
HEIGHT: 71 IN | BODY MASS INDEX: 41.02 KG/M2 | WEIGHT: 293 LBS | DIASTOLIC BLOOD PRESSURE: 90 MMHG | SYSTOLIC BLOOD PRESSURE: 156 MMHG

## 2024-03-26 DIAGNOSIS — D25.1 INTRAMURAL LEIOMYOMA OF UTERUS: ICD-10-CM

## 2024-03-26 DIAGNOSIS — Z01.411 ENCOUNTER FOR WELL WOMAN EXAM WITH ABNORMAL FINDINGS: Primary | ICD-10-CM

## 2024-03-26 DIAGNOSIS — Z80.3 FAMILY HISTORY OF BREAST CANCER: ICD-10-CM

## 2024-03-26 DIAGNOSIS — N92.0 MENORRHAGIA WITH REGULAR CYCLE: ICD-10-CM

## 2024-03-26 LAB — PREGNANCY TEST URINE, POC: NEGATIVE

## 2024-03-26 PROCEDURE — 88305 TISSUE EXAM BY PATHOLOGIST: CPT

## 2024-03-26 PROCEDURE — 87624 HPV HI-RISK TYP POOLED RSLT: CPT

## 2024-03-26 PROCEDURE — 88141 CYTOPATH C/V INTERPRET: CPT | Performed by: PATHOLOGY

## 2024-03-26 PROCEDURE — 99214 OFFICE O/P EST MOD 30 MIN: CPT | Performed by: OBSTETRICS & GYNECOLOGY

## 2024-03-26 PROCEDURE — 81025 URINE PREGNANCY TEST: CPT | Performed by: OBSTETRICS & GYNECOLOGY

## 2024-03-26 PROCEDURE — 88175 CYTOPATH C/V AUTO FLUID REDO: CPT

## 2024-03-26 PROCEDURE — 3008F BODY MASS INDEX DOCD: CPT | Performed by: OBSTETRICS & GYNECOLOGY

## 2024-03-26 PROCEDURE — 88305 TISSUE EXAM BY PATHOLOGIST: CPT | Performed by: PATHOLOGY

## 2024-03-26 PROCEDURE — 58100 BIOPSY OF UTERUS LINING: CPT | Performed by: OBSTETRICS & GYNECOLOGY

## 2024-03-26 ASSESSMENT — ENCOUNTER SYMPTOMS
CONSTIPATION: 0
JOINT SWELLING: 0
COUGH: 0
ACTIVITY CHANGE: 0
AGITATION: 0
DIARRHEA: 0
HEMATURIA: 0
APNEA: 0
DIZZINESS: 0

## 2024-03-26 NOTE — ASSESSMENT & PLAN NOTE
Endometrial biopsy was sent today. Will contact her with results.  After discussion she desires definitive treatment with hysterectomy. She understands that her BMI, size and shape of uterus with fibroid, and no prior pregnancy does increase difficulty of surgery risks of complication.

## 2024-03-26 NOTE — ASSESSMENT & PLAN NOTE
She desires definitive treatment with hysterectomy.  Will refer to advanced laparoscopic surgeon given anticipated challenges of surgery.

## 2024-04-02 LAB
LABORATORY COMMENT REPORT: NORMAL
PATH REPORT.FINAL DX SPEC: NORMAL
PATH REPORT.GROSS SPEC: NORMAL
PATH REPORT.RELEVANT HX SPEC: NORMAL
PATH REPORT.TOTAL CANCER: NORMAL

## 2024-04-03 DIAGNOSIS — D25.1 INTRAMURAL LEIOMYOMA OF UTERUS: Primary | ICD-10-CM

## 2024-04-03 DIAGNOSIS — N92.0 MENORRHAGIA WITH REGULAR CYCLE: ICD-10-CM

## 2024-04-04 ENCOUNTER — TELEPHONE (OUTPATIENT)
Dept: OBSTETRICS AND GYNECOLOGY | Facility: CLINIC | Age: 34
End: 2024-04-04
Payer: COMMERCIAL

## 2024-04-04 NOTE — TELEPHONE ENCOUNTER
----- Message from Gladys Lovett MD sent at 4/3/2024  1:34 PM EDT -----  Endometrial biopsy returned benign with polyp. We discussed plans for hysterectomy during the visit, with plans to refer for advanced trained minimally invasive surgeon.

## 2024-04-05 LAB
CYTOLOGY CMNT CVX/VAG CYTO-IMP: NORMAL
HPV HR 12 DNA GENITAL QL NAA+PROBE: NEGATIVE
HPV HR GENOTYPES PNL CVX NAA+PROBE: NEGATIVE
HPV16 DNA SPEC QL NAA+PROBE: NEGATIVE
HPV18 DNA SPEC QL NAA+PROBE: NEGATIVE
LAB AP HPV GENOTYPE QUESTION: YES
LAB AP HPV HR: NORMAL
LAB AP PREVIOUS ABNORMAL HISTORY: NORMAL
LAB AP TREATMENT HISTORY: NORMAL
LABORATORY COMMENT REPORT: NORMAL
LMP START DATE: NORMAL
PATH REPORT.TOTAL CANCER: NORMAL

## 2024-04-08 PROBLEM — R87.610 ASCUS OF CERVIX WITH NEGATIVE HIGH RISK HPV: Status: ACTIVE | Noted: 2024-04-08

## 2024-04-23 NOTE — PROGRESS NOTES
"Division of Minimally Invasive Gynecologic Surgery  [facility]    04/23/24 Gynecology Consult     HISTORY OF PRESENT ILLNESS:  Tracie Malagon 34 y.o. G0 presents to discuss hysterectomy.     She has regular monthly menses w/ heavy flow. She empties a menstrual cup hourly on heavy days and passage of golf ball size clots.     She has struggled with this for many years. She does not desire future fertility.     She has tried:  - OCP: emotional lability and irregular birth control   - Birth control patch: emotional lability     Imaging:   - Recent pelvic US showed uterus measuring 8cm x 7cm x 8cm. Central dominant fibroid measuring 6cm x 6cm x 5cm.   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   - Recent EMB benign w/ polyp   PMHx: thoracic outlet syndrome (resolved s/p removal of single rib and anterior scalene muscle), ADHD  PSHx: tonsillectomy, thoracic decompression surgery, diagnostic laparoscopy for benign jose daniel-hepatic tumor, D+C     PAST MEDICAL HISTORY:  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 07/05/2022    now resolved s/p thoracic decompression    LANDEN II (cervical intraepithelial neoplasia II) 2021     PAST SURGICAL HISTORY:  Past Surgical History:   Procedure Laterality Date    ADENOIDECTOMY  06/30/2016    Adenoidectomy    CERVICAL BIOPSY  W/ LOOP ELECTRODE EXCISION      CONDYLOMA EXCISION/FULGURATION      LAPAROSCOPY DIAGNOSTIC / BIOPSY / ASPIRATION / LYSIS      for benign jose daniel-hepatic tumor    TONSILLECTOMY  06/30/2016    Tonsillectomy     PHYSICAL EXAMINATION:  VITAL SIGNS: /79   Pulse 67   Ht 1.803 m (5' 11\")   Wt (!) 153 kg (337 lb)   LMP 04/16/2024   BMI 47.00 kg/m²      Constitutional:  No acute distress, " well-nourished and well-developed  HEENT: EOM grossly intact, MMM, neck supple and with full ROM  Pulm:  Effort normal. No accessory muscle usage.  No respiratory distress.  Neurological:  She is alert and oriented to person place and time.  Skin: Warm, no pallor.  Psychiatric:  She has normal mood and affect.    ASSESSMENT:  Tracie Malagon 34 y.o. G0 presents to discuss hysterectomy.     We discussed the standard procedure for laparoscopic hysterectomy, including contained morcellation. We reviewed the risks/benefits/alternatives to surgery. She is aware of the risk of bleeding, infection, and damage to nearby structures, including bladder, ureters, bowel, and vasculature. She is agreeable to blood transfusion if recommended. We discussed the risks/benefits of contained morcellation vs laparotomy, and she is comfortable with proceeding with contained morcellation. We reviewed the small risk of laparotomy and the fact that fertility following hysterectomy is not an option.     PLAN:  - Schedule for total laparoscopic hysterectomy, bilateral salpingectomy, any indicated procedure. PAT: Yes. Any location.   - MRI pelvis     Anna Srivastava MD  04/23/24  2:14 PM

## 2024-04-24 NOTE — PROGRESS NOTES
Chief Complaint  New patient visit, high risk breast cancer surveillance    History Of Present Illness  Tracie Malagon is a very pleasant 34 y.o.  female presenting for high risk breast cancer surveillance. She denies breast surgery or biopsy. She has family history breast cancer, see below.      REPRODUCTIVE HISTORY: menarche age 12, G0, OCP's x 2 years, premenopausal, LMP 4/15/2024.    FAMILY CANCER HISTORY:   Maternal grandmother: breast cancer  Paternal grandmother: Breast cancer  Maternal aunt: Breast cancer  Maternal Uncle: Breast cancer  Maternal cousin (2): Breast cancer  Maternal cousin (3): Breast cancer  Maternal cousin (4): Breast cancer    No genetics done on any family members      Review of Systems  Constitutional:  Negative for appetite change, fatigue, fever and unexpected weight change.   HENT:  Negative for ear pain, hearing loss, nosebleeds, sore throat and trouble swallowing.    Eyes:  Negative for discharge, itching and visual disturbance.   Breast: As stated in HPI.  Respiratory:  Negative for cough, chest tightness and shortness of breath.    Cardiovascular:  Negative for chest pain, palpitations and leg swelling.   Gastrointestinal:  Negative for abdominal pain, constipation, diarrhea and nausea.   Endocrine: Negative for cold intolerance and heat intolerance.   Genitourinary:  Negative for dysuria, frequency, hematuria, pelvic pain and vaginal bleeding.   Musculoskeletal:  Negative for arthralgias, back pain, gait problem, joint swelling and myalgias.   Skin:  Negative for color change and rash.   Allergic/Immunologic: Negative for environmental allergies and food allergies.   Neurological:  Negative for dizziness, tremors, speech difficulty, weakness, numbness and headaches.   Hematological:  Does not bruise/bleed easily.   Psychiatric/Behavioral:  Negative for agitation, dysphoric mood and sleep disturbance. The patient is not nervous/anxious.       Surgical History  She has a  past surgical history that includes Adenoidectomy (06/30/2016); Tonsillectomy (06/30/2016); Laparoscopy diagnostic / biopsy / aspiration / lysis; Cervical biopsy w/ loop electrode excision; and Condyloma excision/fulguration.     Social History  She reports that she quit smoking about 2 months ago. Her smoking use included cigarettes. She started smoking about 2 years ago. She has a 0.5 pack-year smoking history. She has never used smokeless tobacco. She reports current alcohol use. She reports current drug use. Frequency: 1.00 time per week. Drug: Marijuana.    Family History  Family History   Problem Relation Name Age of Onset    Other (Alcohol use disorder) Mother Mom     Atrial fibrillation Mother Mom     Skin cancer Mother Mom     Heart disease Mother Mom     Hernia Mother Mom     Miscarriages / Stillbirths Mother Mom     Hyperlipidemia Mother Mom     Hypothyroidism Mother Mom     Insulin resistance Mother Mom     Alcohol abuse Mother Mom     Cancer Mother Mom     Other (Alcohol use disorder) Father Dad     Other (drug use disorder) Father Dad     Early natural death Father Dad     Heart disease Father Dad     Alcohol abuse Father Dad     Drug abuse Father Dad     Other (alcohol use disorder) Sister Sister     Other (drug use disorder) Sister Sister     Genetic Testing Sister Sister     Mental illness Sister Sister     Miscarriages / Stillbirths Sister Sister     Lupus Sister Sister     Asthma Sister Sister     Alcohol abuse Sister Sister     Drug abuse Sister Sister     Miscarriages / Stillbirths Mother's Sister Aunt l     Breast cancer Maternal Grandmother Grams     Miscarriages / Stillbirths Maternal Grandmother Grams     Heart disease Maternal Grandmother Grams     Thyroid disease Maternal Grandmother Grams     Drug abuse Maternal Grandmother Grams     Alcohol abuse Maternal Grandmother Grams     Skin cancer Maternal Grandmother Grams     Heart disease Maternal Grandfather      Hypertension Maternal  "Grandfather      Drug abuse Maternal Grandfather      Alcohol abuse Maternal Grandfather      Other (bladder cancer) Maternal Grandfather      Other (Drug use disorder) Paternal Grandmother      Other (alcohol use disorder) Paternal Grandmother      Stroke Paternal Grandfather      Other (drug use disorder) Paternal Grandfather      Other (alcohol use disorder) Paternal Grandfather          Allergies  Sulfa (sulfonamide antibiotics)    Past Medical History  She has a past medical history of Abnormal Pap smear of cervix, ADHD (attention deficit hyperactivity disorder), Allergic, Anxiety, Arterial thoracic outlet syndrome of right subclavian artery (07/05/2022), Endometriosis, Fibroid, Headache, Migraine, SOB (shortness of breath), STD (female), Syncope and collapse (07/27/2021), and Urinary tract infection.     Physical Exam  Patient is alert and oriented x3 and in a relaxed and appropriate mood. Her gait is steady and hand grasps are equal. Sclera is clear. The breasts are nearly symmetrical. The tissue is soft without palpable abnormalities, discrete nodules or masses. The skin appear normal. Bilateral nipple piercing. There is no cervical, supraclavicular or axillary lymphadenopathy. Heart rate and rhythm normal, S1 and S2 appreciated. The lungs are clear to auscultation bilaterally. Abdomen is soft and non-tender.      Last Recorded Vitals  Blood pressure 120/87, pulse 72, height 1.803 m (5' 11\"), weight (!) 152 kg (335 lb 10.4 oz), last menstrual period 03/20/2024, not currently breastfeeding.        Patient Discussion/Summary  Your clinical examination is normal. Please return in 6 months for an office visit after genetics evaluation or sooner if you have any problems or concerns.     High risk breast surveillance care plan:  Yearly mammogram with digital breast tomosynthesis - pending genetics   Twice yearly clinical breast examinations  Breast MRI- pending genetics   Monthly self breast examinations &/or " regular self breast awareness  Vitamin D3 2000 IU/daily (over the counter) unless your PCP recommends you take a specific dose  Exercise 3-4 times per week for 45-60 minutes  Limit alcohol to 3-4 drinks per week if you are menopausal  Eat healthy low-fat diet with lots of vegetable & fruits  Risk models indicate personal risk of breast cancer in the next 5 years and  lifetime (age 85-90):  Breast Cancer Risk Assessment Tool Vince: 5-year risk 1.3% (average 0.3%), lifetime risk 30.9%, (average 11.1%)    Discussed with patient importance of genetic referral due to strong family history of breast cancer. Indicated risk assessment tool risk is a baseline and five year risk is low- further information will be able to guide high risk breast cancer surveillance once genetic testing is completed.      IMPORTANT INFORMATION REGARDING YOUR RESULTS    If you receive medical information from My [facility] Personal Health Record (online chart) your results will be released into your chart. This means you may view or see results of your biopsy or procedure before I contact you directly. If this occurs, please call the office and we will discuss your results over the phone.     You can see your health information, review clinical summaries from office visits & test results online when you follow your health with MY  Chart, a personal health record. To sign up go to [email or web address]. If you need assistance with signing up or trouble getting into your account call [employer] Patient Line 24/7 at [phone].    My office phone number is [phone] if you need to get in touch with me or have additional questions or concerns. Thank you for choosing [facility] and trusting me as your healthcare provider. I look forward to seeing you again at your next office visit. I am honored to be a provider on your health care team and I remain dedicated to helping you achieve your health goals.    aMry Sampson,  APRN-CNP

## 2024-04-25 ENCOUNTER — OFFICE VISIT (OUTPATIENT)
Dept: OBSTETRICS AND GYNECOLOGY | Facility: CLINIC | Age: 34
End: 2024-04-25
Payer: COMMERCIAL

## 2024-04-25 ENCOUNTER — OFFICE VISIT (OUTPATIENT)
Dept: SURGICAL ONCOLOGY | Facility: CLINIC | Age: 34
End: 2024-04-25
Payer: COMMERCIAL

## 2024-04-25 VITALS
HEART RATE: 67 BPM | HEIGHT: 71 IN | WEIGHT: 293 LBS | BODY MASS INDEX: 41.02 KG/M2 | SYSTOLIC BLOOD PRESSURE: 129 MMHG | DIASTOLIC BLOOD PRESSURE: 79 MMHG

## 2024-04-25 VITALS
BODY MASS INDEX: 41.02 KG/M2 | HEIGHT: 71 IN | SYSTOLIC BLOOD PRESSURE: 120 MMHG | WEIGHT: 293 LBS | DIASTOLIC BLOOD PRESSURE: 87 MMHG | HEART RATE: 72 BPM

## 2024-04-25 DIAGNOSIS — N92.0 MENORRHAGIA WITH REGULAR CYCLE: ICD-10-CM

## 2024-04-25 DIAGNOSIS — Z12.39 BREAST CANCER SCREENING, HIGH RISK PATIENT: Primary | ICD-10-CM

## 2024-04-25 DIAGNOSIS — Z80.3 FAMILY HISTORY OF BREAST CANCER: ICD-10-CM

## 2024-04-25 DIAGNOSIS — D25.1 INTRAMURAL LEIOMYOMA OF UTERUS: Primary | ICD-10-CM

## 2024-04-25 PROCEDURE — 3008F BODY MASS INDEX DOCD: CPT

## 2024-04-25 PROCEDURE — 99215 OFFICE O/P EST HI 40 MIN: CPT | Performed by: STUDENT IN AN ORGANIZED HEALTH CARE EDUCATION/TRAINING PROGRAM

## 2024-04-25 PROCEDURE — 3008F BODY MASS INDEX DOCD: CPT | Performed by: STUDENT IN AN ORGANIZED HEALTH CARE EDUCATION/TRAINING PROGRAM

## 2024-04-25 PROCEDURE — 99204 OFFICE O/P NEW MOD 45 MIN: CPT

## 2024-04-25 PROCEDURE — 99214 OFFICE O/P EST MOD 30 MIN: CPT

## 2024-04-25 PROCEDURE — 99205 OFFICE O/P NEW HI 60 MIN: CPT | Performed by: STUDENT IN AN ORGANIZED HEALTH CARE EDUCATION/TRAINING PROGRAM

## 2024-04-25 RX ORDER — GABAPENTIN 600 MG/1
600 TABLET ORAL ONCE
Status: CANCELLED | OUTPATIENT
Start: 2024-04-25 | End: 2024-04-25

## 2024-04-25 RX ORDER — HEPARIN SODIUM 5000 [USP'U]/ML
5000 INJECTION, SOLUTION INTRAVENOUS; SUBCUTANEOUS ONCE
Status: CANCELLED | OUTPATIENT
Start: 2024-04-25 | End: 2024-04-25

## 2024-04-25 RX ORDER — ACETAMINOPHEN 325 MG/1
975 TABLET ORAL ONCE
Status: CANCELLED | OUTPATIENT
Start: 2024-04-25 | End: 2024-04-25

## 2024-04-25 RX ORDER — CELECOXIB 400 MG/1
400 CAPSULE ORAL ONCE
Status: CANCELLED | OUTPATIENT
Start: 2024-04-25 | End: 2024-04-25

## 2024-04-25 ASSESSMENT — ENCOUNTER SYMPTOMS
ENDOCRINE NEGATIVE: 0
RESPIRATORY NEGATIVE: 0
CARDIOVASCULAR NEGATIVE: 0
MUSCULOSKELETAL NEGATIVE: 0
GASTROINTESTINAL NEGATIVE: 0
ALLERGIC/IMMUNOLOGIC NEGATIVE: 0
PSYCHIATRIC NEGATIVE: 0
HEMATOLOGIC/LYMPHATIC NEGATIVE: 0
CONSTITUTIONAL NEGATIVE: 0
EYES NEGATIVE: 0
NEUROLOGICAL NEGATIVE: 0

## 2024-04-25 ASSESSMENT — PAIN SCALES - GENERAL
PAINLEVEL: 0-NO PAIN
PAINLEVEL: 4

## 2024-04-25 NOTE — PATIENT INSTRUCTIONS
Your clinical examination is normal. Please return in 6 months for an office visit after genetics evaluation or sooner if you have any problems or concerns.     High risk breast surveillance care plan:  Yearly mammogram with digital breast tomosynthesis - pending genetics   Twice yearly clinical breast examinations  Breast MRI- pending genetics   Monthly self breast examinations &/or regular self breast awareness  Vitamin D3 2000 IU/daily (over the counter) unless your PCP recommends you take a specific dose  Exercise 3-4 times per week for 45-60 minutes  Limit alcohol to 3-4 drinks per week if you are menopausal  Eat healthy low-fat diet with lots of vegetable & fruits  Risk models indicate personal risk of breast cancer in the next 5 years and  lifetime (age 85-90):  Breast Cancer Risk Assessment Tool Vince: 5-year risk 1.3% (average 0.3%), lifetime risk 30.9%, (average 11.1%)    Discussed with patient importance of genetic referral due to strong family history of breast cancer. Indicated risk assessment tool risk is a baseline and five year risk is low- further information will be able to guide high risk breast cancer surveillance once genetic testing is completed.      IMPORTANT INFORMATION REGARDING YOUR RESULTS    If you receive medical information from My [facility] Personal Health Record (online chart) your results will be released into your chart. This means you may view or see results of your biopsy or procedure before I contact you directly. If this occurs, please call the office and we will discuss your results over the phone.     You can see your health information, review clinical summaries from office visits & test results online when you follow your health with MY  Chart, a personal health record. To sign up go to [email or web address]. If you need assistance with signing up or trouble getting into your account call [employer] Patient Line 24/7 at [phone].    My office phone number is  [phone] if you need to get in touch with me or have additional questions or concerns. Thank you for choosing [facility] and trusting me as your healthcare provider. I look forward to seeing you again at your next office visit. I am honored to be a provider on your health care team and I remain dedicated to helping you achieve your health goals.

## 2024-05-01 DIAGNOSIS — Z01.818 PREOP EXAMINATION: Primary | ICD-10-CM

## 2024-05-09 ENCOUNTER — HOSPITAL ENCOUNTER (OUTPATIENT)
Dept: RADIOLOGY | Facility: HOSPITAL | Age: 34
Discharge: HOME | End: 2024-05-09
Payer: COMMERCIAL

## 2024-05-09 VITALS — BODY MASS INDEX: 47.04 KG/M2 | WEIGHT: 293 LBS

## 2024-05-09 DIAGNOSIS — D25.1 INTRAMURAL LEIOMYOMA OF UTERUS: ICD-10-CM

## 2024-05-09 PROCEDURE — A9575 INJ GADOTERATE MEGLUMI 0.1ML: HCPCS | Performed by: STUDENT IN AN ORGANIZED HEALTH CARE EDUCATION/TRAINING PROGRAM

## 2024-05-09 PROCEDURE — 72197 MRI PELVIS W/O & W/DYE: CPT

## 2024-05-09 PROCEDURE — 72197 MRI PELVIS W/O & W/DYE: CPT | Performed by: RADIOLOGY

## 2024-05-09 PROCEDURE — 2550000001 HC RX 255 CONTRASTS: Performed by: STUDENT IN AN ORGANIZED HEALTH CARE EDUCATION/TRAINING PROGRAM

## 2024-05-09 RX ORDER — GADOTERATE MEGLUMINE 376.9 MG/ML
30 INJECTION INTRAVENOUS
Status: COMPLETED | OUTPATIENT
Start: 2024-05-09 | End: 2024-05-09

## 2024-05-09 RX ADMIN — GADOTERATE MEGLUMINE 30 ML: 376.9 INJECTION INTRAVENOUS at 09:15

## 2024-05-13 ENCOUNTER — CLINICAL SUPPORT (OUTPATIENT)
Dept: PREADMISSION TESTING | Facility: HOSPITAL | Age: 34
End: 2024-05-13
Payer: COMMERCIAL

## 2024-05-13 ENCOUNTER — APPOINTMENT (OUTPATIENT)
Dept: PRIMARY CARE | Facility: CLINIC | Age: 34
End: 2024-05-13
Payer: COMMERCIAL

## 2024-05-13 ASSESSMENT — DUKE ACTIVITY SCORE INDEX (DASI)
CAN YOU PARTICIPATE IN STRENOUS SPORTS LIKE SWIMMING, SINGLES TENNIS, FOOTBALL, BASKETBALL, OR SKIING: YES
CAN YOU PARTICIPATE IN MODERATE RECREATIONAL ACTIVITIES LIKE GOLF, BOWLING, DANCING, DOUBLES TENNIS OR THROWING A BASEBALL OR FOOTBALL: YES
CAN YOU HAVE SEXUAL RELATIONS: YES
CAN YOU DO YARD WORK LIKE RAKING LEAVES, WEEDING OR PUSHING A MOWER: YES
DASI METS SCORE: 9.9
CAN YOU DO MODERATE WORK AROUND THE HOUSE LIKE VACUUMING, SWEEPING FLOORS OR CARRYING GROCERIES: YES
CAN YOU TAKE CARE OF YOURSELF (EAT, DRESS, BATHE, OR USE TOILET): YES
TOTAL_SCORE: 58.2
CAN YOU CLIMB A FLIGHT OF STAIRS OR WALK UP A HILL: YES
CAN YOU DO HEAVY WORK AROUND THE HOUSE LIKE SCRUBBING FLOORS OR LIFTING AND MOVING HEAVY FURNITURE: YES
CAN YOU RUN A SHORT DISTANCE: YES
CAN YOU WALK INDOORS, SUCH AS AROUND YOUR HOUSE: YES
CAN YOU WALK A BLOCK OR TWO ON LEVEL GROUND: YES
CAN YOU DO LIGHT WORK AROUND THE HOUSE LIKE DUSTING OR WASHING DISHES: YES

## 2024-05-13 ASSESSMENT — ENCOUNTER SYMPTOMS
SINUS CONGESTION: 1
NEUROLOGICAL NEGATIVE: 1
CONSTITUTIONAL NEGATIVE: 1
BRUISES/BLEEDS EASILY: 1
RESPIRATORY NEGATIVE: 1
GASTROINTESTINAL NEGATIVE: 1
CARDIOVASCULAR NEGATIVE: 1

## 2024-05-13 NOTE — CPM/PAT NURSE NOTE
CPM/PAT Nurse Note      Name: Tracie Malagon (Tracie Malagon)  /Age: 1990/34 y.o. is being seen in [facility] on 2024 for Laparoscopic Assisted Total Hysterectomy; Bilateral Salpingectomy - Bilateral with Anna Srivastava MD on 2024.    PCP: Pt does not have one at this time.     -EKG 2021  Sinus rhythm  Low voltage, precordial leads  RSR' in V1 or V2, right VCD or RVH    GYN: Anna Srivastava, , LOV 2024 for intramural leiomyoma of uterus  Gladys Lovett, , LOV 3/26/2024  Deepa Francis, , LOV 2024    GEN SRG: Thalia Sampson, , LOV 2024 for high risk breast cancer screening    VASC: Low [place], [facility], LOV 2022 for s/p right thoracic outlet decompression      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 2022    now resolved s/p thoracic decompression    C4 cervical fracture (Multi)     LANDEN II (cervical intraepithelial neoplasia II)     Dyscalculia     Endometriosis     Fibrocystic breast changes     Fibroid     GERD (gastroesophageal reflux disease)     Intramural leiomyoma of uterus     Migraines     Neurogenic thoracic outlet syndrome of right brachial plexus        Past Surgical History:   Procedure Laterality Date    ADENOIDECTOMY      CERVICAL BIOPSY  W/ LOOP ELECTRODE EXCISION      CONDYLOMA EXCISION/FULGURATION      DILATION AND CURETTAGE OF UTERUS      HYSTEROSCOPY W/ POLYPECTOMY      LAPAROSCOPY DIAGNOSTIC / BIOPSY / ASPIRATION / LYSIS      for benign jose daniel-hepatic tumor    OTHER SURGICAL HISTORY      anal lesion excision    THORACIC OUTLET SURGERY  2022    TONSILLECTOMY         Patient  reports being sexually active and has had partner(s) who are female and male. She reports using the following method of birth control/protection: None.    Family History   Problem Relation  Name Age of Onset    Other (Alcohol use disorder) Mother Mom     Atrial fibrillation Mother Mom     Skin cancer Mother Mom     Heart disease Mother Mom     Hernia Mother Mom     Miscarriages / Stillbirths Mother Mom     Hyperlipidemia Mother Mom     Hypothyroidism Mother Mom     Insulin resistance Mother Mom     Alcohol abuse Mother Mom     Cancer Mother Mom     Other (Alcohol use disorder) Father Dad     Other (drug use disorder) Father Dad     Early natural death Father Dad     Heart disease Father Dad     Alcohol abuse Father Dad     Drug abuse Father Dad     Other (alcohol use disorder) Sister Sister     Other (drug use disorder) Sister Sister     Genetic Testing Sister Sister     Mental illness Sister Sister     Miscarriages / Stillbirths Sister Sister     Lupus Sister Sister     Asthma Sister Sister     Alcohol abuse Sister Sister     Drug abuse Sister Sister     Miscarriages / Stillbirths Mother's Sister Aunt l     Breast cancer Mother's Sister Aunt l     Breast cancer Mother's Brother      Breast cancer Maternal Grandmother Grams     Miscarriages / Stillbirths Maternal Grandmother Grams     Heart disease Maternal Grandmother Grams     Thyroid disease Maternal Grandmother Grams     Drug abuse Maternal Grandmother Grams     Alcohol abuse Maternal Grandmother Grams     Skin cancer Maternal Grandmother Grams     Heart disease Maternal Grandfather      Hypertension Maternal Grandfather      Drug abuse Maternal Grandfather      Alcohol abuse Maternal Grandfather      Other (bladder cancer) Maternal Grandfather      Other (Drug use disorder) Paternal Grandmother      Other (alcohol use disorder) Paternal Grandmother      Breast cancer Paternal Grandmother      Stroke Paternal Grandfather      Other (drug use disorder) Paternal Grandfather      Other (alcohol use disorder) Paternal Grandfather      Breast cancer Maternal Cousin      Breast cancer Maternal Cousin      Breast cancer Maternal Cousin      Breast cancer  Maternal Cousin         Allergies   Allergen Reactions    Sulfa (Sulfonamide Antibiotics) Anaphylaxis       Prior to Admission medications    Medication Sig Start Date End Date Taking? Authorizing Provider   multivitamin tablet Take 2 tablets by mouth once daily.   Yes Historical Provider, MD   cyclobenzaprine (Flexeril) 10 mg tablet Take 1 tablet (10 mg) by mouth 3 times a day as needed for muscle spasms for up to 5 days. 1/17/24 3/26/24  Emile Barajas, DO   psyllium seed, with sugar, (FIBER ORAL) Take by mouth once daily.  5/13/24  Historical Provider, MD ARANGO ROS:   Constitutional:   neg    Neuro/Psych:   neg    Eyes:   Ears:   Nose:    sinus congestion  Mouth:   Throat:   neg    Neck:   Cardio:   neg    Respiratory:   neg    Endocrine:   GI:   neg    :   neg    Musculoskeletal:   Hematologic:    bruises/bleeds easily  Skin:      DASI Risk Score      Flowsheet Row Most Recent Value   DASI SCORE 58.2   METS Score (Will be calculated only when all the questions are answered) 9.9          Caprini DVT Assessment    No data to display       Modified Frailty Index    No data to display       CHADS2 Stroke Risk  Current as of 6 hours ago        N/A 3 to 100%: High Risk   2 to < 3%: Medium Risk   0 to < 2%: Low Risk     Last Change: N/A          This score determines the patient's risk of having a stroke if the patient has atrial fibrillation.        This score is not applicable to this patient. Components are not calculated.          Revised Cardiac Risk Index    No data to display       Apfel Simplified Score    No data to display       Risk Analysis Index Results This Encounter    No data found in the last 1 encounters.         Nurse Plan of Action:     Medication Instructions: Please stop all vitamins, supplements, and any NSAIDs (Alevel, Ibuprofen, Motrin, etc) 7 days prior to surgery.

## 2024-05-20 ENCOUNTER — PRE-ADMISSION TESTING (OUTPATIENT)
Dept: PREADMISSION TESTING | Facility: HOSPITAL | Age: 34
End: 2024-05-20
Payer: COMMERCIAL

## 2024-05-20 VITALS
SYSTOLIC BLOOD PRESSURE: 139 MMHG | HEART RATE: 102 BPM | OXYGEN SATURATION: 98 % | BODY MASS INDEX: 41.02 KG/M2 | HEIGHT: 71 IN | TEMPERATURE: 98.1 F | WEIGHT: 293 LBS | DIASTOLIC BLOOD PRESSURE: 87 MMHG

## 2024-05-20 DIAGNOSIS — Z01.818 PREOP EXAMINATION: ICD-10-CM

## 2024-05-20 DIAGNOSIS — R79.89 ELEVATED TSH: ICD-10-CM

## 2024-05-20 DIAGNOSIS — N92.0 MENORRHAGIA WITH REGULAR CYCLE: Primary | ICD-10-CM

## 2024-05-20 LAB
ABO GROUP (TYPE) IN BLOOD: NORMAL
ANTIBODY SCREEN: NORMAL
APPEARANCE UR: ABNORMAL
APTT PPP: 29 SECONDS (ref 27–38)
BACTERIA #/AREA URNS AUTO: ABNORMAL /HPF
BILIRUB UR STRIP.AUTO-MCNC: NEGATIVE MG/DL
CAOX CRY #/AREA UR COMP ASSIST: ABNORMAL /HPF
COLOR UR: YELLOW
ERYTHROCYTE [DISTWIDTH] IN BLOOD BY AUTOMATED COUNT: 11.9 % (ref 11.5–14.5)
GLUCOSE UR STRIP.AUTO-MCNC: NORMAL MG/DL
HCT VFR BLD AUTO: 39.9 % (ref 36–46)
HGB BLD-MCNC: 12.7 G/DL (ref 12–16)
HOLD SPECIMEN: NORMAL
INR PPP: 0.9 (ref 0.9–1.1)
KETONES UR STRIP.AUTO-MCNC: NEGATIVE MG/DL
LEUKOCYTE ESTERASE UR QL STRIP.AUTO: NEGATIVE
MCH RBC QN AUTO: 29.5 PG (ref 26–34)
MCHC RBC AUTO-ENTMCNC: 31.8 G/DL (ref 32–36)
MCV RBC AUTO: 93 FL (ref 80–100)
MUCOUS THREADS #/AREA URNS AUTO: ABNORMAL /LPF
NITRITE UR QL STRIP.AUTO: NEGATIVE
NRBC BLD-RTO: 0 /100 WBCS (ref 0–0)
PH UR STRIP.AUTO: 5.5 [PH]
PLATELET # BLD AUTO: 322 X10*3/UL (ref 150–450)
PROT UR STRIP.AUTO-MCNC: ABNORMAL MG/DL
PROTHROMBIN TIME: 10.6 SECONDS (ref 9.8–12.8)
RBC # BLD AUTO: 4.3 X10*6/UL (ref 4–5.2)
RBC # UR STRIP.AUTO: NEGATIVE /UL
RBC #/AREA URNS AUTO: ABNORMAL /HPF
RH FACTOR (ANTIGEN D): NORMAL
SP GR UR STRIP.AUTO: 1.03
SQUAMOUS #/AREA URNS AUTO: ABNORMAL /HPF
T4 FREE SERPL-MCNC: 1.28 NG/DL (ref 0.78–1.48)
TSH SERPL-ACNC: 5.76 MIU/L (ref 0.44–3.98)
UROBILINOGEN UR STRIP.AUTO-MCNC: NORMAL MG/DL
WBC # BLD AUTO: 6.6 X10*3/UL (ref 4.4–11.3)
WBC #/AREA URNS AUTO: ABNORMAL /HPF

## 2024-05-20 PROCEDURE — 36415 COLL VENOUS BLD VENIPUNCTURE: CPT

## 2024-05-20 PROCEDURE — 84443 ASSAY THYROID STIM HORMONE: CPT

## 2024-05-20 PROCEDURE — 85027 COMPLETE CBC AUTOMATED: CPT

## 2024-05-20 PROCEDURE — 81001 URINALYSIS AUTO W/SCOPE: CPT

## 2024-05-20 PROCEDURE — 99203 OFFICE O/P NEW LOW 30 MIN: CPT | Performed by: NURSE PRACTITIONER

## 2024-05-20 PROCEDURE — 84439 ASSAY OF FREE THYROXINE: CPT

## 2024-05-20 PROCEDURE — 86901 BLOOD TYPING SEROLOGIC RH(D): CPT

## 2024-05-20 PROCEDURE — 85610 PROTHROMBIN TIME: CPT

## 2024-05-20 ASSESSMENT — ENCOUNTER SYMPTOMS
CHILLS: 0
ENDOCRINE NEGATIVE: 1
MUSCULOSKELETAL NEGATIVE: 1
GASTROINTESTINAL NEGATIVE: 1
CARDIOVASCULAR NEGATIVE: 1
RESPIRATORY NEGATIVE: 1
NECK NEGATIVE: 1
NEUROLOGICAL NEGATIVE: 1
DIFFICULTY URINATING: 1
FEVER: 0
CONSTITUTIONAL NEGATIVE: 1
EYES NEGATIVE: 1
SINUS CONGESTION: 1
RHINORRHEA: 1

## 2024-05-20 ASSESSMENT — DUKE ACTIVITY SCORE INDEX (DASI)
CAN YOU WALK INDOORS, SUCH AS AROUND YOUR HOUSE: YES
CAN YOU RUN A SHORT DISTANCE: YES
CAN YOU DO HEAVY WORK AROUND THE HOUSE LIKE SCRUBBING FLOORS OR LIFTING AND MOVING HEAVY FURNITURE: YES
CAN YOU WALK A BLOCK OR TWO ON LEVEL GROUND: YES
CAN YOU DO LIGHT WORK AROUND THE HOUSE LIKE DUSTING OR WASHING DISHES: YES
CAN YOU DO YARD WORK LIKE RAKING LEAVES, WEEDING OR PUSHING A MOWER: YES
CAN YOU HAVE SEXUAL RELATIONS: YES
CAN YOU TAKE CARE OF YOURSELF (EAT, DRESS, BATHE, OR USE TOILET): YES
TOTAL_SCORE: 58.2
CAN YOU PARTICIPATE IN MODERATE RECREATIONAL ACTIVITIES LIKE GOLF, BOWLING, DANCING, DOUBLES TENNIS OR THROWING A BASEBALL OR FOOTBALL: YES
CAN YOU DO MODERATE WORK AROUND THE HOUSE LIKE VACUUMING, SWEEPING FLOORS OR CARRYING GROCERIES: YES
DASI METS SCORE: 9.9
CAN YOU PARTICIPATE IN STRENOUS SPORTS LIKE SWIMMING, SINGLES TENNIS, FOOTBALL, BASKETBALL, OR SKIING: YES
CAN YOU CLIMB A FLIGHT OF STAIRS OR WALK UP A HILL: YES

## 2024-05-20 ASSESSMENT — LIFESTYLE VARIABLES: SMOKING_STATUS: SMOKER

## 2024-05-20 ASSESSMENT — PAIN - FUNCTIONAL ASSESSMENT: PAIN_FUNCTIONAL_ASSESSMENT: 0-10

## 2024-05-20 ASSESSMENT — PAIN SCALES - GENERAL: PAINLEVEL_OUTOF10: 0 - NO PAIN

## 2024-05-20 NOTE — PREPROCEDURE INSTRUCTIONS
Thank you for visiting [facility] today for your pre-procedure evaluation, you were seen by     Mary Hood, MSN, NP-C, CNP  Family Nurse Practitioner  Department of Anesthesiology and Perioperative Medicine  Main phone: [phone]  Fax :[phone]    This summary includes instructions and information to aid you during your perioperative period.  Please read carefully. If you have any questions about your visit today, please call the number listed above.  If you become ill or have any changes to your health before your surgery, please contact your primary care provider and alert your surgeon.    Preparing for your Surgery       Exercises  Preoperative Deep Breathing Exercises  Why it is important to do deep breathing exercises before my surgery?  Deep breathing exercises strengthen your breathing muscles.  This helps you to recover after your surgery and decreases the chance of breathing complications.  How are the deep breathing exercises done?  Sit straight with your back supported.  Breathe in deeply and slowly through your nose. Your lower rib cage should expand and your abdomen may move forward.  Hold that breath for 3 to 5 seconds.  Breathe out through pursed lips, slowly and completely.  Rest and repeat 10 times every hour while awake.  Rest longer if you become dizzy or lightheaded.      Preoperative Brain Exercises    What are brain exercises?  A brain exercise is any activity that engages your thinking (cognitive) skills.    What types of activities are considered brain exercises?  Jigsaw puzzles, crossword puzzles, word jumble, memory games, word search, and many more.  Many can be found free online or on your phone via a mobile edelmira.    Why should I do brain exercises before my surgery?  More recent research has shown brain exercise before surgery can lower the risk of postoperative delirium (confusion) which can be especially important for older adults.   Patients who did brain exercises for 5 to 10 hours the days before surgery, cut their risk of postoperative delirium in half up to 1 week after surgery.    Sit-to-Stand Exercise    What is the sit-to-stand exercise?  The sit-to-stand exercise strengthens the muscles of your lower body and muscles in the center of your body (core muscles for stability) helping to maintain and improve your strength and mobility.  How do I do the sit-to-stand exercise?  The goal is to do this exercise without using your arms or hands.  If this is too difficult, use your arms and hands or a chair with armrests to help slowly push yourself to the standing position and lower yourself back to the sitting position. As the movement becomes easier use your arms and hands less.    Steps to the sit-to-stand exercise  Sit up tall in a sturdy chair, knees bent, feet flat on the floor shoulder-width apart.  Shift your hips/pelvis forward in the chair to correctly position yourself for the next movement.  Lean forward at your hips.  Stand up straight putting equal weight on both feet.  Check to be sure you are properly aligned with the chair, in a slow controlled movement sit back down.  Repeat this exercise 10-15 times.  If needed you can do it fewer times until your strength improves.  Rest for 1 minute.  Do another 10-15 sit-to-stand exercises.  Try to do this in the morning and evening.        Instructions    Preoperative Fasting Guidelines    Why must I stop eating and drinking near surgery time?  With sedation, food or liquid in your stomach can enter your lungs causing serious complications  Food can increase nausea and vomiting  When do I need to stop eating and drinking before my surgery?      Do not eat any food after midnight the night before your surgery/procedure. You may have up to 13.5 ounces of clear liquid until TWO hours before your instructed arrival time to the hospital.  This includes water, black tea/coffee, (no milk or cream)  apple juice, and electrolyte drinks (Gatorade). You may chew gum until TWO hours before your surgery/procedure            Simple things you can do to help prevent blood clots     Blood clots are blockages that can form in the body's veins. When a blood clot forms in your deep veins, it may be called a deep vein thrombosis, or DVT for short. Blood clots can happen in any part of the body where blood flows, but they are most common in the arms and legs. If a piece of a blood clot breaks free and travels to the lungs, it is called a pulmonary embolus (PE). A PE can be a very serious problem.         Being in the hospital or having surgery can raise your chances of getting a blood clot because you may not be well enough to move around as much as you normally do.         Ways you can help prevent blood clots in the hospital       Wearing SCDs  SCDs stands for Sequential Compression Devices.   SCDs are special sleeves that wrap around your legs. They attach to a pump that fills them with air to gently squeeze your legs every few minutes.  This helps return the blood in your legs to your heart.   SCDs should only be taken off when walking or bathing. SCDs may not be comfortable, but they can help save your life.              Pump SCD leg sleeves  Wearing compression stockings - if your doctor orders them. These special snug-fitting stockings gently squeeze your legs to help blood flow.       Walking. Walking helps move the blood in your legs.   If your doctor says it is ok, try walking the halls at least   5 times a day. Ask us to help you get up, so you don't fall.      Taking any blood-thinning medicines your doctor orders.              Ways you can help prevent blood clots at home         Wearing compression stockings - if your doctor orders them.   Walking - to help move the blood in your legs.    Taking any blood-thinning medicines your doctor orders.      Signs of a blood clot or PE    Tell your doctor or nurse right  away if you have any of the problems listed below.         If you are at home, seek medical care right away. Call 911 for chest pain or problems breathing.            Signs of a blood clot (DVT) - such as pain, swelling, redness, or warmth in your arm or legs.  Signs of a pulmonary embolism (PE) - such as chest pain or feeling short of breath      Tobacco and Alcohol;  Do not drink alcohol or smoke within 24 hours of surgery.  It is best to quit smoking for as long as possible before any surgery or procedure.      The Week before Surgery        Seven days before Surgery  Check your [facility] medication instructions  Do the exercises provided to you by [facility]   Arrange for a responsible, adult licensed  to take you home after surgery and stay with you for 24 hours.  You will not be permitted to drive yourself home if you have received any anesthetic/sedation  Six days before surgery  Check your [facility] medication instructions  Do the exercises provided to you by [facility]   Start using Chlorhexidene (CHG) body wash if prescribed  Five days before surgery  Check your [facility] medication instructions  Do the exercises provided to you by [facility]   Continue to use CHG body wash if prescribed  Three days before surgery  Check your [facility] medication instructions  Do the exercises provided to you by [facility]   Continue to use CHG body wash if prescribed  Two days before surgery  Check your [facility] medication instructions  Do the exercises provided to you by [facility]   Continue to use CHG body wash if prescribed    The Day before Surgery       Check your [facility] medication and all other [facility] instructions including when to stop eating and drinking  You will be called with your arrival time for surgery in the late afternoon.  If you do not receive a call please reach out to your surgeon's office.  Do not smoke or drink 24 hours before surgery  Prepare items to bring with you to the hospital  Shower with your chlorhexidine wash if prescribed  Brush your teeth and use  your chlorhexidine dental rinse if prescribed    The Day of Surgery       Check your [facility] medication instructions  Ensure you follow the instructions for when to stop eating and drinking  Shower, if prescribed use CHG.  Do not apply any lotions, creams, moisturizers, perfume or deodorant  Brush your teeth and use your CHG dental rinse if prescribed  Wear loose comfortable clothing  Avoid make-up  Remove  jewelry and piercings, consider professional piercing removal with a plastic spacer if needed  Bring photo ID and Insurance card  Bring an accurate medication list that includes medication dose, frequency and allergies  Bring a copy of your advanced directives (will, health care power of )  Bring any devices and controllers as well as medical devices you have been provided with for surgery (CPAP, slings, braces, etc.)  Dentures, eyeglasses, and contacts will be removed before surgery, please bring cases for contacts or glasses

## 2024-05-20 NOTE — [facility] H&P
[facility] Evaluation       Name: Tracie Malagon (Tracie Malagon)  /Age:  y.o.     Visit Type:   In-Person       Chief Complaint: Laparoscopic Assisted Total Hysterectomy; Bilateral Salpingectomy - Bilateral     HPI Patient is a 34 year old female scheduled for surgery with Dr. Anna Srivastava on 24 related to Intramural leiomyoma of uterus     Patient referred by Dr. Srivastava for preoperative evaluation of ADHD, anxiety, migraines, GERD, intramural leiomyoma of uterus.     To note patient sates she is a natural red head and has concerns regarding anesthesia.     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 2022    now resolved s/p thoracic decompression    C4 cervical fracture (Multi)     LANDEN II (cervical intraepithelial neoplasia II)     Dyscalculia     Endometriosis     Fibrocystic breast changes     Fibroid     GERD (gastroesophageal reflux disease)     Intramural leiomyoma of uterus     Migraines     Neurogenic thoracic outlet syndrome of right brachial plexus      Past Surgical History:   Procedure Laterality Date    ADENOIDECTOMY  1996    CERVICAL BIOPSY  W/ LOOP ELECTRODE EXCISION      CONDYLOMA EXCISION/FULGURATION      DILATION AND CURETTAGE OF UTERUS      HYSTEROSCOPY W/ POLYPECTOMY      LAPAROSCOPY DIAGNOSTIC / BIOPSY / ASPIRATION / LYSIS      for benign jose daniel-hepatic tumor    OTHER SURGICAL HISTORY      anal lesion excision    THORACIC OUTLET SURGERY  2022    TONSILLECTOMY  1996     Family History   Problem Relation Name Age of Onset    Other (Alcohol use disorder) Mother Mom     Atrial fibrillation Mother Mom     Skin cancer Mother Mom     Heart disease Mother Mom     Hernia Mother Mom     Miscarriages / Stillbirths Mother Mom     Hyperlipidemia Mother Mom     Hypothyroidism Mother Mom     Insulin resistance Mother Mom     Alcohol  abuse Mother Mom     Cancer Mother Mom     Other (Alcohol use disorder) Father Dad     Other (drug use disorder) Father Dad     Early natural death Father Dad     Heart disease Father Dad     Alcohol abuse Father Dad     Drug abuse Father Dad     Other (alcohol use disorder) Sister Sister     Other (drug use disorder) Sister Sister     Genetic Testing Sister Sister     Mental illness Sister Sister     Miscarriages / Stillbirths Sister Sister     Lupus Sister Sister     Asthma Sister Sister     Alcohol abuse Sister Sister     Drug abuse Sister Sister     Breast cancer Maternal Grandmother Grams     Miscarriages / Stillbirths Maternal Grandmother Grams     Heart disease Maternal Grandmother Grams     Thyroid disease Maternal Grandmother Grams     Drug abuse Maternal Grandmother Grams     Alcohol abuse Maternal Grandmother Grams     Skin cancer Maternal Grandmother Grams     Heart disease Maternal Grandfather      Hypertension Maternal Grandfather      Drug abuse Maternal Grandfather      Alcohol abuse Maternal Grandfather      Other (bladder cancer) Maternal Grandfather      Other (Drug use disorder) Paternal Grandmother      Other (alcohol use disorder) Paternal Grandmother      Breast cancer Paternal Grandmother      Stroke Paternal Grandfather      Other (drug use disorder) Paternal Grandfather      Other (alcohol use disorder) Paternal Grandfather      Miscarriages / Stillbirths Mother's Sister Aunt l     Breast cancer Mother's Sister Aunt l     Breast cancer Mother's Brother      Breast cancer Maternal Cousin      Breast cancer Maternal Cousin      Breast cancer Maternal Cousin      Breast cancer Maternal Cousin       Allergies   Allergen Reactions    Sulfa (Sulfonamide Antibiotics) Anaphylaxis     Prior to Admission medications    Medication Sig Start Date End Date Taking? Authorizing Provider   cyclobenzaprine (Flexeril) 10 mg tablet Take 1 tablet (10 mg) by mouth 3 times a day as needed for muscle spasms for  up to 5 days. 1/17/24 3/26/24  Emile Barajas, DO   multivitamin tablet Take 2 tablets by mouth once daily.    Historical Provider, MD ARANGO ROS:   Constitutional:   neg     no fever   no chills  Neuro/Psych:   neg    Eyes:   neg    Ears:   Nose:    Seasonal allergies  neg     nasal discharge   sinus congestion  Mouth:   neg    Throat:   neg    Neck:   neg    Cardio:   neg    Respiratory:   neg    Endocrine:   neg    GI:   neg    :    Has to force out urine at times   difficulty urinating  Musculoskeletal:   neg    Hematologic:   neg     no history of blood transfusion   no blood clots  Skin:  neg      Physical Exam  Vitals reviewed.   Constitutional:       Appearance: Normal appearance. She is obese.   HENT:      Head: Normocephalic and atraumatic.      Nose: Nose normal.      Mouth/Throat:      Mouth: Mucous membranes are moist.      Pharynx: Oropharynx is clear.   Eyes:      Extraocular Movements: Extraocular movements intact.      Pupils: Pupils are equal, round, and reactive to light.   Cardiovascular:      Rate and Rhythm: Normal rate and regular rhythm.      Heart sounds: Normal heart sounds.   Pulmonary:      Effort: Pulmonary effort is normal.      Breath sounds: Normal breath sounds.   Abdominal:      General: Abdomen is flat. Bowel sounds are normal.      Palpations: Abdomen is soft.   Musculoskeletal:         General: Normal range of motion.      Cervical back: Normal range of motion and neck supple.   Skin:     General: Skin is warm and dry.   Neurological:      Mental Status: She is alert and oriented to person, place, and time.   Psychiatric:         Mood and Affect: Mood normal.         Behavior: Behavior normal.         Thought Content: Thought content normal.         Judgment: Judgment normal.        PAT AIRWAY:   Airway:     Mallampati::  II    Neck ROM::  Full   States nothing loose or removable     Visit Vitals  /87   Pulse 102   Temp 36.7 °C (98.1 °F) (Temporal)     Visit Vitals  BP  "139/87   Pulse 102   Temp 36.7 °C (98.1 °F) (Temporal)   Ht 1.803 m (5' 11\")   Wt (!) 153 kg (337 lb 11.2 oz)   LMP 04/16/2024   SpO2 98%   BMI 47.10 kg/m²   OB Status Having periods   Smoking Status Former   BSA 2.77 m²     DASI Risk Score      Flowsheet Row Most Recent Value   DASI SCORE 58.2   METS Score (Will be calculated only when all the questions are answered) 9.9          Caprini DVT Assessment      Flowsheet Row Most Recent Value   DVT Score 8   Current Status Major surgery planned, lasting over 3 hours   Age Less than 40 years   BMI 41-50 (Morbid obesity)          Modified Frailty Index      Flowsheet Row Most Recent Value   Modified Frailty Index Calculator 0          CHADS2 Stroke Risk  Current as of 4 minutes ago        N/A 3 to 100%: High Risk   2 to < 3%: Medium Risk   0 to < 2%: Low Risk     Last Change: N/A          This score determines the patient's risk of having a stroke if the patient has atrial fibrillation.        This score is not applicable to this patient. Components are not calculated.          Revised Cardiac Risk Index      Flowsheet Row Most Recent Value   Revised Cardiac Risk Calculator 1          Apfel Simplified Score      Flowsheet Row Most Recent Value   Apfel Simplified Score Calculator 3          Risk Analysis Index Results This Encounter    No data found in the last 1 encounters.       Stop Bang Score      Flowsheet Row Most Recent Value   Do you snore loudly? 1   Do you often feel tired or fatigued after your sleep? 1   Has anyone ever observed you stop breathing in your sleep? 1   Do you have or are you being treated for high blood pressure? 1   Recent BMI (Calculated) 47.1   Is BMI greater than 35 kg/m2? 1=Yes   Age older than 50 years old? 0=No   Is your neck circumference greater than 17 inches (Male) or 16 inches (Female)? 0   Gender - Male 0=No   STOP-BANG Total Score 5          Assessment and Plan:     Neuro:   No neurologic diagnoses, however, the patient is at an " increased risk for post operative delirium secondary to  type and duration of surgery    The patient is at an increased risk for perioperative stroke secondary to female, general anesthesia, and op time >2.5 hours    Patient given information on brain exercises and delirium prevention.    ADHD, anxiety, migraines  States not currently taking any medications for ADHD or anxiety. Feels her symptoms are controlled.   Does note she does use cyclobenzaprine as needed for neck pain    HEENT/Airway  No diagnosis or significant findings on chart review or clinical presentation and evaluation.    Cardiovascular  No diagnosis or significant findings on chart review or clinical presentation and evaluation.    RCRI  The patient meets 0-1 RCRI criteria and therefore has a less than 1% risk of major adverse cardiac complications.  METS  The patient's functional capacity is greater than 4 METS.  MACE  30-day risk for MACE of 1 predictor, 6.0% risk for cardiac death, nonfatal myocardial infarction, and nonfatal cardiac arrest  BRAULIO  0.0% risk for <25th percentile    Pulmonary   No diagnosis or significant findings on chart review or clinical presentation and evaluation.    STOP BANG Score of 5, which places patient at high risk for having MELODY.  ARISCAT 23, low, 1.6% risk of in-hospital postoperative pulmonary complications  PRODIGY 8, intermediate of respiratory depression episode.    Patient given information on deep breathing exercises.     Renal  No diagnosis or significant findings on chart review or clinical presentation and evaluation.    Endocrine  No diagnosis or significant findings on chart review or clinical presentation and evaluation.    Hematology  No diagnosis or significant findings on chart review or clinical presentation and evaluation.    Caprini score 8, high risk of VTE    Transfusion Evaluation  A type and screen was obtained given the likelihood for perioperative transfusion of blood or blood  products.    Patient given information on DVT prevention.     GI    GERD   States not currently taking medication    Eat 10- 0  Apfel: 3 points 61% risk for post operative nausea/vomiting.     Genitourinary    Intramural leiomyoma of uterus  States painful menstruation. Scheduled for surgery    ID  No diagnosis or significant findings on chart review or clinical presentation and evaluation.    UA with reflex culture ordered    Musculoskeletal  No diagnosis or significant findings on chart review or clinical presentation and evaluation.    -Preoperative medication instructions were provided and reviewed with the patient.  Any additional testing or evaluation was explained to the patient.  NPO Instructions were discussed, and the patient's questions were answered prior to conclusion of this encounter    Labs ordered:    BMP unable to be processed by lab. Order placed for repeat   BMP  .  Recent Results (from the past 504 hour(s))   TSH with reflex to Free T4 if abnormal    Collection Time: 05/20/24  8:20 AM   Result Value Ref Range    Thyroid Stimulating Hormone 5.76 (H) 0.44 - 3.98 mIU/L   CBC    Collection Time: 05/20/24  8:20 AM   Result Value Ref Range    WBC 6.6 4.4 - 11.3 x10*3/uL    nRBC 0.0 0.0 - 0.0 /100 WBCs    RBC 4.30 4.00 - 5.20 x10*6/uL    Hemoglobin 12.7 12.0 - 16.0 g/dL    Hematocrit 39.9 36.0 - 46.0 %    MCV 93 80 - 100 fL    MCH 29.5 26.0 - 34.0 pg    MCHC 31.8 (L) 32.0 - 36.0 g/dL    RDW 11.9 11.5 - 14.5 %    Platelets 322 150 - 450 x10*3/uL   Coagulation Screen    Collection Time: 05/20/24  8:20 AM   Result Value Ref Range    Protime 10.6 9.8 - 12.8 seconds    INR 0.9 0.9 - 1.1    aPTT 29 27 - 38 seconds   Type And Screen    Collection Time: 05/20/24  8:20 AM   Result Value Ref Range    ABO TYPE O     Rh TYPE POS     ANTIBODY SCREEN NEG    Urinalysis with Reflex Culture and Microscopic    Collection Time: 05/20/24  8:20 AM   Result Value Ref Range    Color, Urine Yellow Light-Yellow, Yellow,  Dark-Yellow    Appearance, Urine Turbid (N) Clear    Specific Gravity, Urine 1.028 1.005 - 1.035    pH, Urine 5.5 5.0, 5.5, 6.0, 6.5, 7.0, 7.5, 8.0    Protein, Urine 10 (TRACE) NEGATIVE, 10 (TRACE), 20 (TRACE) mg/dL    Glucose, Urine Normal Normal mg/dL    Blood, Urine NEGATIVE NEGATIVE    Ketones, Urine NEGATIVE NEGATIVE mg/dL    Bilirubin, Urine NEGATIVE NEGATIVE    Urobilinogen, Urine Normal Normal mg/dL    Nitrite, Urine NEGATIVE NEGATIVE    Leukocyte Esterase, Urine NEGATIVE NEGATIVE   Extra Urine Gray Tube    Collection Time: 05/20/24  8:20 AM   Result Value Ref Range    Extra Tube Hold for add-ons.    Urinalysis Microscopic    Collection Time: 05/20/24  8:20 AM   Result Value Ref Range    WBC, Urine 1-5 1-5, NONE /HPF    RBC, Urine 6-10 (A) NONE, 1-2, 3-5 /HPF    Squamous Epithelial Cells, Urine 26-50 (1+) Reference range not established. /HPF    Bacteria, Urine 1+ (A) NONE SEEN /HPF    Mucus, Urine 2+ Reference range not established. /LPF    Calcium Oxalate Crystals, Urine 4+ (A) NONE, 1+ /HPF   Thyroxine, Free    Collection Time: 05/20/24  8:20 AM   Result Value Ref Range    Thyroxine, Free 1.28 0.78 - 1.48 ng/dL   Basic Metabolic Panel    Collection Time: 06/04/24 11:04 AM   Result Value Ref Range    Glucose 100 (H) 74 - 99 mg/dL    Sodium 137 136 - 145 mmol/L    Potassium 4.8 3.5 - 5.3 mmol/L    Chloride 107 98 - 107 mmol/L    Bicarbonate 26 21 - 32 mmol/L    Anion Gap 9 (L) 10 - 20 mmol/L    Urea Nitrogen 12 6 - 23 mg/dL    Creatinine 0.74 0.50 - 1.05 mg/dL    eGFR >90 >60 mL/min/1.73m*2    Calcium 9.0 8.6 - 10.3 mg/dL

## 2024-06-04 ENCOUNTER — LAB (OUTPATIENT)
Dept: LAB | Facility: LAB | Age: 34
End: 2024-06-04
Payer: COMMERCIAL

## 2024-06-04 DIAGNOSIS — Z01.818 PREOP EXAMINATION: ICD-10-CM

## 2024-06-04 LAB
ANION GAP SERPL CALC-SCNC: 9 MMOL/L (ref 10–20)
BUN SERPL-MCNC: 12 MG/DL (ref 6–23)
CALCIUM SERPL-MCNC: 9 MG/DL (ref 8.6–10.3)
CHLORIDE SERPL-SCNC: 107 MMOL/L (ref 98–107)
CO2 SERPL-SCNC: 26 MMOL/L (ref 21–32)
CREAT SERPL-MCNC: 0.74 MG/DL (ref 0.5–1.05)
EGFRCR SERPLBLD CKD-EPI 2021: >90 ML/MIN/1.73M*2
GLUCOSE SERPL-MCNC: 100 MG/DL (ref 74–99)
POTASSIUM SERPL-SCNC: 4.8 MMOL/L (ref 3.5–5.3)
SODIUM SERPL-SCNC: 137 MMOL/L (ref 136–145)

## 2024-06-04 PROCEDURE — 80048 BASIC METABOLIC PNL TOTAL CA: CPT

## 2024-06-04 PROCEDURE — 36415 COLL VENOUS BLD VENIPUNCTURE: CPT

## 2024-06-06 ENCOUNTER — ANESTHESIA EVENT (OUTPATIENT)
Dept: OPERATING ROOM | Facility: HOSPITAL | Age: 34
End: 2024-06-06
Payer: COMMERCIAL

## 2024-06-06 NOTE — HOSPITAL COURSE
34 y.o G0 with dysmenorrhea and intramural leiomyoma presenting for laparoscopic total hysterectomy, bilateral salpingectomy, and any other indicated procedure. Imaging consistent with central fibroid measuring 6x5.5x5.3cm.     She has regular monthly menses with heavy flow, empties a menstrual cup hourly on heavy days and passage of golf ball size clots. Has previously tried OCPs and birth control patches, she does not desire future fertility.     Pre-op labs (5/20/24): Hgb 12.7  Plt 322, Cr 0.74    She is a redhead and has concerns regarding anesthesia.     Imaging  MRI Pelvis (5/10/24)  UTERUS:  The uterus is anteverted and measures 11.0 x 10.0 x 9.4 cm.   The  thickness of the junctional zone is within normal limits.   The  endometrium has normal thickness and appearance.  A few small  nabothian cysts are noted in the upper cervix. In the anterior corpus  there is a T2 hypointense, heterogeneously enhancing intramural mass  indenting on and distorting the endometrium measuring 7.4 x 6.9 x 7.6  cm.      OVARIES/ADNEXA:      RIGHT:  The right ovary is normal in size and appears unremarkable.   There  is no cystic or solid mass or endometriosis. Small follicles are  present. There is  no hydrosalpinx.      LEFT:  The left ovary is normal in size and appears unremarkable.   There is  no cystic or solid mass or endometriosis. Small follicles are  present. There is  no hydrosalpinx.      PERITONEAL FLUID:  No  free or loculated fluid collections are evident in the pelvis.      PELVIC LYMPH NODES:  No abnormally enlarged pelvic lymph nodes are identified.      IMPRESSION:  1.  7.6 cm anterior uterine fibroid with some submucosal extension.  2.  Unremarkable ovaries.  3. No findings of pelvic endometriosis.    TVUS 2/29/24  FINDINGS:  UTERUS:  Size:  7.9 x 7.4 x 7.6 cm  Masses: 6.0 x 5.5 x 5.3 cm presumed fibroid.  Endometrial thickness: 3 mm.      CERVIX:  Incidentally noted nabothian cysts.      RIGHT OVARY:  Right  ovary size: 3.2 x 1.3 x 1.5 cm      No dominant cyst or ovarian mass. Color/arterial flow is seen.      LEFT OVARY:  Left ovary size: 3.3 x 2.0 x 2.0 cm      No dominant cyst or ovarian mass. Color/arterial flow is seen.      No adnexal mass seen.      FREE FLUID:   None.      IMPRESSION:  Dominant central fibroid measuring up to 6.0 x 5.5 x 5.3 cm.    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

## 2024-06-07 ENCOUNTER — HOSPITAL ENCOUNTER (OUTPATIENT)
Facility: HOSPITAL | Age: 34
Setting detail: OUTPATIENT SURGERY
Discharge: HOME | End: 2024-06-07
Attending: STUDENT IN AN ORGANIZED HEALTH CARE EDUCATION/TRAINING PROGRAM | Admitting: STUDENT IN AN ORGANIZED HEALTH CARE EDUCATION/TRAINING PROGRAM
Payer: COMMERCIAL

## 2024-06-07 ENCOUNTER — ANESTHESIA (OUTPATIENT)
Dept: OPERATING ROOM | Facility: HOSPITAL | Age: 34
End: 2024-06-07
Payer: COMMERCIAL

## 2024-06-07 VITALS
SYSTOLIC BLOOD PRESSURE: 122 MMHG | RESPIRATION RATE: 16 BRPM | BODY MASS INDEX: 41.95 KG/M2 | WEIGHT: 293 LBS | OXYGEN SATURATION: 97 % | DIASTOLIC BLOOD PRESSURE: 75 MMHG | TEMPERATURE: 96.8 F | HEIGHT: 70 IN | HEART RATE: 88 BPM

## 2024-06-07 DIAGNOSIS — R52 POSTPARTUM PAIN (HHS-HCC): ICD-10-CM

## 2024-06-07 DIAGNOSIS — D25.1 INTRAMURAL LEIOMYOMA OF UTERUS: ICD-10-CM

## 2024-06-07 DIAGNOSIS — Z90.710 S/P HYSTERECTOMY: Primary | ICD-10-CM

## 2024-06-07 LAB
ABO GROUP (TYPE) IN BLOOD: NORMAL
ANTIBODY SCREEN: NORMAL
PREGNANCY TEST URINE, POC: NEGATIVE
RH FACTOR (ANTIGEN D): NORMAL

## 2024-06-07 PROCEDURE — 3700000002 HC GENERAL ANESTHESIA TIME - EACH INCREMENTAL 1 MINUTE: Performed by: STUDENT IN AN ORGANIZED HEALTH CARE EDUCATION/TRAINING PROGRAM

## 2024-06-07 PROCEDURE — A4217 STERILE WATER/SALINE, 500 ML: HCPCS | Mod: SE | Performed by: STUDENT IN AN ORGANIZED HEALTH CARE EDUCATION/TRAINING PROGRAM

## 2024-06-07 PROCEDURE — 76942 ECHO GUIDE FOR BIOPSY: CPT

## 2024-06-07 PROCEDURE — 88307 TISSUE EXAM BY PATHOLOGIST: CPT | Performed by: PATHOLOGY

## 2024-06-07 PROCEDURE — 2500000001 HC RX 250 WO HCPCS SELF ADMINISTERED DRUGS (ALT 637 FOR MEDICARE OP): Mod: SE | Performed by: ANESTHESIOLOGY

## 2024-06-07 PROCEDURE — 96372 THER/PROPH/DIAG INJ SC/IM: CPT | Performed by: STUDENT IN AN ORGANIZED HEALTH CARE EDUCATION/TRAINING PROGRAM

## 2024-06-07 PROCEDURE — 7100000010 HC PHASE TWO TIME - EACH INCREMENTAL 1 MINUTE: Performed by: STUDENT IN AN ORGANIZED HEALTH CARE EDUCATION/TRAINING PROGRAM

## 2024-06-07 PROCEDURE — 2500000004 HC RX 250 GENERAL PHARMACY W/ HCPCS (ALT 636 FOR OP/ED): Mod: SE | Performed by: ANESTHESIOLOGIST ASSISTANT

## 2024-06-07 PROCEDURE — 86901 BLOOD TYPING SEROLOGIC RH(D): CPT | Performed by: STUDENT IN AN ORGANIZED HEALTH CARE EDUCATION/TRAINING PROGRAM

## 2024-06-07 PROCEDURE — 7100000009 HC PHASE TWO TIME - INITIAL BASE CHARGE: Performed by: STUDENT IN AN ORGANIZED HEALTH CARE EDUCATION/TRAINING PROGRAM

## 2024-06-07 PROCEDURE — 36415 COLL VENOUS BLD VENIPUNCTURE: CPT | Performed by: STUDENT IN AN ORGANIZED HEALTH CARE EDUCATION/TRAINING PROGRAM

## 2024-06-07 PROCEDURE — 3600000009 HC OR TIME - EACH INCREMENTAL 1 MINUTE - PROCEDURE LEVEL FOUR: Performed by: STUDENT IN AN ORGANIZED HEALTH CARE EDUCATION/TRAINING PROGRAM

## 2024-06-07 PROCEDURE — 7100000001 HC RECOVERY ROOM TIME - INITIAL BASE CHARGE: Performed by: STUDENT IN AN ORGANIZED HEALTH CARE EDUCATION/TRAINING PROGRAM

## 2024-06-07 PROCEDURE — 2500000004 HC RX 250 GENERAL PHARMACY W/ HCPCS (ALT 636 FOR OP/ED): Mod: SE | Performed by: ANESTHESIOLOGY

## 2024-06-07 PROCEDURE — 58573 TLH W/T/O UTERUS OVER 250 G: CPT | Performed by: STUDENT IN AN ORGANIZED HEALTH CARE EDUCATION/TRAINING PROGRAM

## 2024-06-07 PROCEDURE — 3700000001 HC GENERAL ANESTHESIA TIME - INITIAL BASE CHARGE: Performed by: STUDENT IN AN ORGANIZED HEALTH CARE EDUCATION/TRAINING PROGRAM

## 2024-06-07 PROCEDURE — 88307 TISSUE EXAM BY PATHOLOGIST: CPT | Mod: TC,SUR | Performed by: STUDENT IN AN ORGANIZED HEALTH CARE EDUCATION/TRAINING PROGRAM

## 2024-06-07 PROCEDURE — 99223 1ST HOSP IP/OBS HIGH 75: CPT | Performed by: STUDENT IN AN ORGANIZED HEALTH CARE EDUCATION/TRAINING PROGRAM

## 2024-06-07 PROCEDURE — 2500000004 HC RX 250 GENERAL PHARMACY W/ HCPCS (ALT 636 FOR OP/ED): Mod: SE | Performed by: STUDENT IN AN ORGANIZED HEALTH CARE EDUCATION/TRAINING PROGRAM

## 2024-06-07 PROCEDURE — 2500000004 HC RX 250 GENERAL PHARMACY W/ HCPCS (ALT 636 FOR OP/ED): Mod: SE

## 2024-06-07 PROCEDURE — 2500000005 HC RX 250 GENERAL PHARMACY W/O HCPCS: Mod: SE | Performed by: ANESTHESIOLOGIST ASSISTANT

## 2024-06-07 PROCEDURE — 7100000002 HC RECOVERY ROOM TIME - EACH INCREMENTAL 1 MINUTE: Performed by: STUDENT IN AN ORGANIZED HEALTH CARE EDUCATION/TRAINING PROGRAM

## 2024-06-07 PROCEDURE — 2500000001 HC RX 250 WO HCPCS SELF ADMINISTERED DRUGS (ALT 637 FOR MEDICARE OP): Mod: SE | Performed by: STUDENT IN AN ORGANIZED HEALTH CARE EDUCATION/TRAINING PROGRAM

## 2024-06-07 PROCEDURE — 3600000004 HC OR TIME - INITIAL BASE CHARGE - PROCEDURE LEVEL FOUR: Performed by: STUDENT IN AN ORGANIZED HEALTH CARE EDUCATION/TRAINING PROGRAM

## 2024-06-07 PROCEDURE — 2500000005 HC RX 250 GENERAL PHARMACY W/O HCPCS: Mod: SE | Performed by: ANESTHESIOLOGY

## 2024-06-07 PROCEDURE — 2720000007 HC OR 272 NO HCPCS: Performed by: STUDENT IN AN ORGANIZED HEALTH CARE EDUCATION/TRAINING PROGRAM

## 2024-06-07 RX ORDER — GABAPENTIN 600 MG/1
600 TABLET ORAL ONCE
Status: COMPLETED | OUTPATIENT
Start: 2024-06-07 | End: 2024-06-07

## 2024-06-07 RX ORDER — EPINEPHRINE 0.1 MG/ML
INJECTION INTRACARDIAC; INTRAVENOUS AS NEEDED
Status: DISCONTINUED | OUTPATIENT
Start: 2024-06-07 | End: 2024-06-07

## 2024-06-07 RX ORDER — HYDROMORPHONE HYDROCHLORIDE 1 MG/ML
INJECTION, SOLUTION INTRAMUSCULAR; INTRAVENOUS; SUBCUTANEOUS AS NEEDED
Status: DISCONTINUED | OUTPATIENT
Start: 2024-06-07 | End: 2024-06-07

## 2024-06-07 RX ORDER — MIDAZOLAM HYDROCHLORIDE 1 MG/ML
INJECTION INTRAMUSCULAR; INTRAVENOUS AS NEEDED
Status: DISCONTINUED | OUTPATIENT
Start: 2024-06-07 | End: 2024-06-07

## 2024-06-07 RX ORDER — ACETAMINOPHEN 500 MG
1000 TABLET ORAL EVERY 6 HOURS PRN
Qty: 90 TABLET | Refills: 0 | Status: SHIPPED | OUTPATIENT
Start: 2024-06-07 | End: 2024-07-07

## 2024-06-07 RX ORDER — ROCURONIUM BROMIDE 10 MG/ML
INJECTION, SOLUTION INTRAVENOUS AS NEEDED
Status: DISCONTINUED | OUTPATIENT
Start: 2024-06-07 | End: 2024-06-07

## 2024-06-07 RX ORDER — HYDROMORPHONE HYDROCHLORIDE 1 MG/ML
0.2 INJECTION, SOLUTION INTRAMUSCULAR; INTRAVENOUS; SUBCUTANEOUS EVERY 5 MIN PRN
Status: DISCONTINUED | OUTPATIENT
Start: 2024-06-07 | End: 2024-06-07 | Stop reason: HOSPADM

## 2024-06-07 RX ORDER — HYDROMORPHONE HYDROCHLORIDE 1 MG/ML
0.5 INJECTION, SOLUTION INTRAMUSCULAR; INTRAVENOUS; SUBCUTANEOUS EVERY 5 MIN PRN
Status: DISCONTINUED | OUTPATIENT
Start: 2024-06-07 | End: 2024-06-07 | Stop reason: HOSPADM

## 2024-06-07 RX ORDER — LIDOCAINE HYDROCHLORIDE 10 MG/ML
0.1 INJECTION, SOLUTION EPIDURAL; INFILTRATION; INTRACAUDAL; PERINEURAL ONCE
Status: DISCONTINUED | OUTPATIENT
Start: 2024-06-07 | End: 2024-06-07 | Stop reason: HOSPADM

## 2024-06-07 RX ORDER — IBUPROFEN 600 MG/1
600 TABLET ORAL EVERY 6 HOURS PRN
Qty: 90 TABLET | Refills: 0 | Status: SHIPPED | OUTPATIENT
Start: 2024-06-07 | End: 2024-07-07

## 2024-06-07 RX ORDER — FENTANYL CITRATE 50 UG/ML
INJECTION, SOLUTION INTRAMUSCULAR; INTRAVENOUS AS NEEDED
Status: DISCONTINUED | OUTPATIENT
Start: 2024-06-07 | End: 2024-06-07

## 2024-06-07 RX ORDER — LIDOCAINE HCL/PF 100 MG/5ML
SYRINGE (ML) INTRAVENOUS AS NEEDED
Status: DISCONTINUED | OUTPATIENT
Start: 2024-06-07 | End: 2024-06-07

## 2024-06-07 RX ORDER — OXYCODONE HYDROCHLORIDE 5 MG/1
5 TABLET ORAL EVERY 4 HOURS PRN
Status: DISCONTINUED | OUTPATIENT
Start: 2024-06-07 | End: 2024-06-07 | Stop reason: HOSPADM

## 2024-06-07 RX ORDER — SODIUM CHLORIDE 0.9 G/100ML
IRRIGANT IRRIGATION AS NEEDED
Status: DISCONTINUED | OUTPATIENT
Start: 2024-06-07 | End: 2024-06-07 | Stop reason: HOSPADM

## 2024-06-07 RX ORDER — PROPOFOL 10 MG/ML
INJECTION, EMULSION INTRAVENOUS CONTINUOUS PRN
Status: DISCONTINUED | OUTPATIENT
Start: 2024-06-07 | End: 2024-06-07

## 2024-06-07 RX ORDER — ONDANSETRON 4 MG/1
4 TABLET, ORALLY DISINTEGRATING ORAL EVERY 4 HOURS PRN
Qty: 10 TABLET | Refills: 0 | Status: SHIPPED | OUTPATIENT
Start: 2024-06-07

## 2024-06-07 RX ORDER — HEPARIN SODIUM 5000 [USP'U]/ML
5000 INJECTION, SOLUTION INTRAVENOUS; SUBCUTANEOUS ONCE
Status: COMPLETED | OUTPATIENT
Start: 2024-06-07 | End: 2024-06-07

## 2024-06-07 RX ORDER — SODIUM CHLORIDE, SODIUM LACTATE, POTASSIUM CHLORIDE, CALCIUM CHLORIDE 600; 310; 30; 20 MG/100ML; MG/100ML; MG/100ML; MG/100ML
100 INJECTION, SOLUTION INTRAVENOUS CONTINUOUS
Status: DISCONTINUED | OUTPATIENT
Start: 2024-06-07 | End: 2024-06-07 | Stop reason: HOSPADM

## 2024-06-07 RX ORDER — PROPOFOL 10 MG/ML
INJECTION, EMULSION INTRAVENOUS AS NEEDED
Status: DISCONTINUED | OUTPATIENT
Start: 2024-06-07 | End: 2024-06-07

## 2024-06-07 RX ORDER — KETOROLAC TROMETHAMINE 30 MG/ML
30 INJECTION, SOLUTION INTRAMUSCULAR; INTRAVENOUS ONCE
Status: COMPLETED | OUTPATIENT
Start: 2024-06-07 | End: 2024-06-07

## 2024-06-07 RX ORDER — ONDANSETRON HYDROCHLORIDE 2 MG/ML
4 INJECTION, SOLUTION INTRAVENOUS ONCE AS NEEDED
Status: DISCONTINUED | OUTPATIENT
Start: 2024-06-07 | End: 2024-06-07 | Stop reason: HOSPADM

## 2024-06-07 RX ORDER — SODIUM CHLORIDE, SODIUM LACTATE, POTASSIUM CHLORIDE, CALCIUM CHLORIDE 600; 310; 30; 20 MG/100ML; MG/100ML; MG/100ML; MG/100ML
INJECTION, SOLUTION INTRAVENOUS CONTINUOUS PRN
Status: DISCONTINUED | OUTPATIENT
Start: 2024-06-07 | End: 2024-06-07

## 2024-06-07 RX ORDER — ROPIVACAINE HYDROCHLORIDE 5 MG/ML
INJECTION, SOLUTION EPIDURAL; INFILTRATION; PERINEURAL AS NEEDED
Status: DISCONTINUED | OUTPATIENT
Start: 2024-06-07 | End: 2024-06-07

## 2024-06-07 RX ORDER — OXYCODONE HYDROCHLORIDE 5 MG/1
5 TABLET ORAL EVERY 6 HOURS PRN
Qty: 10 TABLET | Refills: 0 | Status: SHIPPED | OUTPATIENT
Start: 2024-06-07

## 2024-06-07 RX ORDER — SENNOSIDES 8.6 MG/1
1 TABLET ORAL 2 TIMES DAILY
Qty: 60 TABLET | Refills: 0 | Status: SHIPPED | OUTPATIENT
Start: 2024-06-07 | End: 2024-07-07

## 2024-06-07 RX ORDER — ACETAMINOPHEN 325 MG/1
975 TABLET ORAL ONCE
Status: COMPLETED | OUTPATIENT
Start: 2024-06-07 | End: 2024-06-07

## 2024-06-07 RX ORDER — METRONIDAZOLE 500 MG/100ML
INJECTION, SOLUTION INTRAVENOUS AS NEEDED
Status: DISCONTINUED | OUTPATIENT
Start: 2024-06-07 | End: 2024-06-07

## 2024-06-07 RX ORDER — CEFAZOLIN 1 G/1
INJECTION, POWDER, FOR SOLUTION INTRAVENOUS AS NEEDED
Status: DISCONTINUED | OUTPATIENT
Start: 2024-06-07 | End: 2024-06-07

## 2024-06-07 RX ORDER — ONDANSETRON HYDROCHLORIDE 2 MG/ML
INJECTION, SOLUTION INTRAVENOUS AS NEEDED
Status: DISCONTINUED | OUTPATIENT
Start: 2024-06-07 | End: 2024-06-07

## 2024-06-07 RX ORDER — METHOCARBAMOL 100 MG/ML
INJECTION, SOLUTION INTRAMUSCULAR; INTRAVENOUS AS NEEDED
Status: DISCONTINUED | OUTPATIENT
Start: 2024-06-07 | End: 2024-06-07

## 2024-06-07 RX ORDER — CELECOXIB 200 MG/1
400 CAPSULE ORAL ONCE
Status: COMPLETED | OUTPATIENT
Start: 2024-06-07 | End: 2024-06-07

## 2024-06-07 RX ADMIN — ROPIVACAINE HYDROCHLORIDE 30 ML: 5 INJECTION, SOLUTION EPIDURAL; INFILTRATION; PERINEURAL at 07:01

## 2024-06-07 RX ADMIN — OXYCODONE HYDROCHLORIDE 5 MG: 5 TABLET ORAL at 12:44

## 2024-06-07 RX ADMIN — EPINEPHRINE 50 MCG: 0.1 INJECTION INTRAVENOUS at 07:01

## 2024-06-07 RX ADMIN — PROPOFOL 20 MG: 10 INJECTION, EMULSION INTRAVENOUS at 11:13

## 2024-06-07 RX ADMIN — ROCURONIUM BROMIDE 60 MG: 10 INJECTION INTRAVENOUS at 07:44

## 2024-06-07 RX ADMIN — FENTANYL CITRATE 100 MCG: 50 INJECTION, SOLUTION INTRAMUSCULAR; INTRAVENOUS at 07:44

## 2024-06-07 RX ADMIN — HYDROMORPHONE HYDROCHLORIDE 0.5 MG: 1 INJECTION, SOLUTION INTRAMUSCULAR; INTRAVENOUS; SUBCUTANEOUS at 11:14

## 2024-06-07 RX ADMIN — METHOCARBAMOL 1000 MG: 1000 INJECTION, SOLUTION INTRAMUSCULAR; INTRAVENOUS at 10:38

## 2024-06-07 RX ADMIN — ONDANSETRON 4 MG: 2 INJECTION INTRAMUSCULAR; INTRAVENOUS at 10:59

## 2024-06-07 RX ADMIN — PROPOFOL 20 MG: 10 INJECTION, EMULSION INTRAVENOUS at 11:28

## 2024-06-07 RX ADMIN — ROCURONIUM BROMIDE 20 MG: 10 INJECTION INTRAVENOUS at 08:53

## 2024-06-07 RX ADMIN — SUGAMMADEX 400 MG: 100 INJECTION, SOLUTION INTRAVENOUS at 11:53

## 2024-06-07 RX ADMIN — GABAPENTIN 600 MG: 300 CAPSULE ORAL at 06:53

## 2024-06-07 RX ADMIN — CEFAZOLIN 3 G: 1 INJECTION, POWDER, FOR SOLUTION INTRAMUSCULAR; INTRAVENOUS at 08:15

## 2024-06-07 RX ADMIN — SODIUM CHLORIDE, POTASSIUM CHLORIDE, SODIUM LACTATE AND CALCIUM CHLORIDE 100 ML/HR: 600; 310; 30; 20 INJECTION, SOLUTION INTRAVENOUS at 13:25

## 2024-06-07 RX ADMIN — PROPOFOL 30 MG: 10 INJECTION, EMULSION INTRAVENOUS at 11:16

## 2024-06-07 RX ADMIN — Medication 2 L/MIN: at 12:45

## 2024-06-07 RX ADMIN — KETOROLAC TROMETHAMINE 30 MG: 30 INJECTION, SOLUTION INTRAMUSCULAR; INTRAVENOUS at 14:34

## 2024-06-07 RX ADMIN — METRONIDAZOLE 500 MG: 500 INJECTION, SOLUTION INTRAVENOUS at 08:17

## 2024-06-07 RX ADMIN — PROPOFOL 20 MCG/KG/MIN: 10 INJECTION, EMULSION INTRAVENOUS at 09:31

## 2024-06-07 RX ADMIN — PROPOFOL 200 MG: 10 INJECTION, EMULSION INTRAVENOUS at 07:44

## 2024-06-07 RX ADMIN — DEXAMETHASONE SODIUM PHOSPHATE 4 MG: 4 INJECTION INTRA-ARTICULAR; INTRALESIONAL; INTRAMUSCULAR; INTRAVENOUS; SOFT TISSUE at 07:01

## 2024-06-07 RX ADMIN — HEPARIN SODIUM 5000 UNITS: 5000 INJECTION INTRAVENOUS; SUBCUTANEOUS at 07:09

## 2024-06-07 RX ADMIN — CELECOXIB 400 MG: 200 CAPSULE ORAL at 06:53

## 2024-06-07 RX ADMIN — SODIUM CHLORIDE, POTASSIUM CHLORIDE, SODIUM LACTATE AND CALCIUM CHLORIDE: 600; 310; 30; 20 INJECTION, SOLUTION INTRAVENOUS at 07:36

## 2024-06-07 RX ADMIN — HYDROMORPHONE HYDROCHLORIDE 0.5 MG: 1 INJECTION, SOLUTION INTRAMUSCULAR; INTRAVENOUS; SUBCUTANEOUS at 08:20

## 2024-06-07 RX ADMIN — ROCURONIUM BROMIDE 10 MG: 10 INJECTION INTRAVENOUS at 10:19

## 2024-06-07 RX ADMIN — MIDAZOLAM HYDROCHLORIDE 2 MG: 1 INJECTION, SOLUTION INTRAMUSCULAR; INTRAVENOUS at 07:30

## 2024-06-07 RX ADMIN — ROCURONIUM BROMIDE 20 MG: 10 INJECTION INTRAVENOUS at 09:35

## 2024-06-07 RX ADMIN — LIDOCAINE HYDROCHLORIDE 60 MG: 20 INJECTION INTRAVENOUS at 07:44

## 2024-06-07 RX ADMIN — ACETAMINOPHEN 975 MG: 325 TABLET ORAL at 06:53

## 2024-06-07 SDOH — HEALTH STABILITY: MENTAL HEALTH: CURRENT SMOKER: 0

## 2024-06-07 ASSESSMENT — PAIN SCALES - GENERAL
PAINLEVEL_OUTOF10: 0 - NO PAIN
PAINLEVEL_OUTOF10: 5 - MODERATE PAIN
PAINLEVEL_OUTOF10: 0 - NO PAIN
PAINLEVEL_OUTOF10: 5 - MODERATE PAIN
PAINLEVEL_OUTOF10: 4
PAINLEVEL_OUTOF10: 0 - NO PAIN
PAINLEVEL_OUTOF10: 3
PAIN_LEVEL: 4
PAINLEVEL_OUTOF10: 0 - NO PAIN

## 2024-06-07 ASSESSMENT — PAIN - FUNCTIONAL ASSESSMENT
PAIN_FUNCTIONAL_ASSESSMENT: 0-10

## 2024-06-07 ASSESSMENT — COLUMBIA-SUICIDE SEVERITY RATING SCALE - C-SSRS
1. IN THE PAST MONTH, HAVE YOU WISHED YOU WERE DEAD OR WISHED YOU COULD GO TO SLEEP AND NOT WAKE UP?: NO
6. HAVE YOU EVER DONE ANYTHING, STARTED TO DO ANYTHING, OR PREPARED TO DO ANYTHING TO END YOUR LIFE?: NO
2. HAVE YOU ACTUALLY HAD ANY THOUGHTS OF KILLING YOURSELF?: NO

## 2024-06-07 NOTE — ANESTHESIA PROCEDURE NOTES
Airway  Date/Time: 6/7/2024 7:46 AM  Urgency: elective    Airway not difficult    Staffing  Performed: [facility]ILCIA   Authorized by: Shashi Paz MD    Performed by: AMBREEN Norman  Patient location during procedure: OR    Indications and Patient Condition  Indications for airway management: anesthesia  Spontaneous ventilation: present  Sedation level: deep  Preoxygenated: yes  Mask difficulty assessment: 1 - vent by mask  Planned trial extubation    Final Airway Details  Final airway type: endotracheal airway      Successful airway: ETT  Cuffed: yes   Successful intubation technique: video laryngoscopy  Endotracheal tube insertion site: oral  Blade size: #3  ETT size (mm): 7.0  Cormack-Lehane Classification: grade I - full view of glottis  Placement verified by: chest auscultation and capnometry   Measured from: lips  ETT to lips (cm): 20  Number of attempts at approach: 1

## 2024-06-07 NOTE — DISCHARGE INSTRUCTIONS
What care is needed at home?  Ask your doctor what you need to do when you go home. Make sure you ask questions if you do not understand what you need to do.  You can take off the bandaid covering your incision in 1-2 days. The steri strips covering your other incisions will fall off on their own.  You can shower, but do not scrub your incisions  Do not lift things over 10 pounds (4.5 kg)  Do not put anything in your vagina until cleared by your provider: no sex, douching, tampons  Be sure to wash your hands before and after touching your wound or dressing.  Do not scrub your incision sites, let warm soapy water flow over them.  You can expect some bleeding from your vagina for a few weeks. You may use sanitary pads but not tampons.  Your bowel movements may take some time to get back to normal. Eat small meals high in fiber to avoid hard stools. Drink 6 to 8 glasses of water each day.  Try to walk each day. Start by walking a little more than you did the day before. Walking boosts blood flow and helps prevent lung, belly, and blood problems.  Talk with your doctor about safe sex as you can still be exposed to sexually transmitted diseases.    What follow-up care is needed?  We will call you to schedule a follow up visit with your surgeon. Be sure to keep your visits.  You have stitches. They will dissolve on their own. The steri strip will fall off on their own as well.    What drugs may be needed?  We are sending you home with ibuprofen, tylenol, and oxycodone (for pain), senna (a stool softener) and zofran (for nausea).     What problems could happen?  Infection  Wound opening  Heavy blood loss  Blood clots in your legs or lungs  Damage to your bowel, bladder, and other organs inside the belly  Trouble passing bowel movements    When do I need to call the doctor?  Signs of infection such as a fever of 100.4°F (38°C) or higher, chills, pain with passing urine.  Signs of wound infection such as swelling, redness,  warmth around the wound; too much pain when touched; yellowish, greenish, or bloody discharge; foul smell coming from the cut site; cut site opens up.  Excessive blood in your sanitary pads or more than six soaked pads in a day. (Spotting is normal).  Smelly green or dark yellow vaginal discharge  Upset stomach, throwing up, or very bad belly pain  Pain not helped by drugs you are taking  No bowel movement after 3 days  If you feel the need to pass urine but urine will not come out even after 6 hours  Swelling in your leg or arm that is much greater on one side than the other    Teach Back: Helping You Understand  The Teach Back Method helps you understand the information we are giving you. After you talk with the staff, tell them in your own words what you learned. This helps to make sure the staff has described each thing clearly. It also helps to explain things that may have been confusing. Before going home, make sure you can do these:  I can tell you about my procedure.  I can tell you how to care for my cut site.  I can tell you what I will do if I have a fever, chills, bad smelling drainage from the vagina, or bad belly pain.

## 2024-06-07 NOTE — PROCEDURES
Peripheral Block    Patient location during procedure: pre-op  Start time: 6/7/2024 6:56 AM  End time: 6/7/2024 7:10 AM  Reason for block: at surgeon's request and post-op pain management  Staffing  Performed: resident   Authorized by: Meche Boone MD    Performed by: Gloria Sanchez MD  Preanesthetic Checklist  Completed: patient identified, IV checked, site marked, risks and benefits discussed, surgical consent, monitors and equipment checked, pre-op evaluation and timeout performed   Timeout performed at: 6/7/2024 6:56 AM  Peripheral Block  Patient position: laying flat  Prep: ChloraPrep  Patient monitoring: heart rate and continuous pulse ox  Block type: adductor canal  Laterality: B/L  Injection technique: single-shot  Guidance: ultrasound guided  Local infiltration: ropivacaine  Infiltration strength: 0.5 %  Dose: 25 mL  Needle  Needle type: pencil-tip   Needle gauge: 22 G  Needle length: 8 cm  Needle localization: ultrasound guidance     image stored in chart  Assessment  Injection assessment: negative aspiration for heme, no paresthesia on injection, incremental injection and local visualized surrounding nerve on ultrasound  Paresthesia pain: none  Heart rate change: no  Slow fractionated injection: yes  Additional Notes  Adductor canal block: Informed consent obtained.  Risks, benefits, and alternatives discussed.  ASA monitors placed, and timeout performed.  Patient positioned, prepped with chlorhexidine, and draped with sterile towels.  Ultrasound guidance was used to visualize the saphenous nerve at the adductor canal and surrounding structures with visualization of the needle throughout duration of the procedure.  Aspiration negative.  25 cc of 0.5% ropivacaine, dexamethasone 4 mg, 8mcg precedex and 1:200,000 epinephrine injected.  Patient tolerated the procedure well.    Timeout by RADHA Lang

## 2024-06-07 NOTE — ANESTHESIA POSTPROCEDURE EVALUATION
Patient: Tracie Malagon    Procedure Summary       Date: 06/07/24 Room / Location: [facility] OR 02 / Virtual [facility] OR    Anesthesia Start: 0715 Anesthesia Stop: 1209    Procedure: Laparoscopic Assisted Total Hysterectomy; Bilateral Salpingectomy, CYSTOSCOPY (Bilateral) Diagnosis:       Intramural leiomyoma of uterus      (Intramural leiomyoma of uterus [D25.1])    Surgeons: Anna Srivastava MD Responsible Provider: Shashi Paz MD    Anesthesia Type: general ASA Status: 2            Anesthesia Type: general    Vitals Value Taken Time   /86 06/07/24 1205   Temp 36 °C (96.8 °F) 06/07/24 1205   Pulse 97 06/07/24 1205   Resp 17 06/07/24 1205   SpO2 99 06/07/24 1211       Anesthesia Post Evaluation    Patient location during evaluation: PACU  Patient participation: complete - patient participated  Level of consciousness: awake and alert  Pain score: 4  Pain management: adequate  Airway patency: patent  Cardiovascular status: acceptable  Respiratory status: acceptable  Hydration status: acceptable  Postoperative Nausea and Vomiting: none      There were no known notable events for this encounter.

## 2024-06-07 NOTE — ANESTHESIA POSTPROCEDURE EVALUATION
Patient: Tracie Malagon    Procedure Summary       Date: 06/07/24 Room / Location: [facility] OR 02 / Virtual [facility] OR    Anesthesia Start: 0715 Anesthesia Stop: 1209    Procedure: Laparoscopic Assisted Total Hysterectomy; Bilateral Salpingectomy, CYSTOSCOPY (Bilateral) Diagnosis:       Intramural leiomyoma of uterus      (Intramural leiomyoma of uterus [D25.1])    Surgeons: Anna Srivastava MD Responsible Provider: Shashi Paz MD    Anesthesia Type: general ASA Status: 2            Anesthesia Type: general    Vitals Value Taken Time   /86 06/07/24 1205   Temp 36 °C (96.8 °F) 06/07/24 1205   Pulse 84 06/07/24 1206   Resp 16 06/07/24 1206   SpO2 96 % 06/07/24 1206   Vitals shown include unfiled device data.    Anesthesia Post Evaluation    Patient location during evaluation: PACU  Patient participation: complete - patient cannot participate  Level of consciousness: sleepy but conscious  Pain management: adequate  Multimodal analgesia pain management approach  Airway patency: patent  Cardiovascular status: acceptable and hemodynamically stable  Respiratory status: acceptable, spontaneous ventilation and face mask  Hydration status: acceptable  Postoperative Nausea and Vomiting: none        There were no known notable events for this encounter.

## 2024-06-07 NOTE — CONSULTS
Tracie Malagon is a 34 y.o. year old female patient who presents for laparoscopic total hysterectomy and BSO with Dr. Srivastava on 6/7 24. Acute Pain consulted for block for postoperative pain control.     Anticipated Postop Pain Issues -   Palliative: typically relieved with IV analgesics and regional local anesthetics  Provocative: typically with movement  Quality: typically burning and aching  Radiation: typically none  Severity: typically severe 8-10/10  Timing: typically constant    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 07/05/2022    now resolved s/p thoracic decompression    C4 cervical fracture (Multi)     LANDEN II (cervical intraepithelial neoplasia II) 2021    Dyscalculia     Endometriosis     Fibrocystic breast changes     Fibroid     GERD (gastroesophageal reflux disease)     Intramural leiomyoma of uterus     Migraines     Neurogenic thoracic outlet syndrome of right brachial plexus         Past Surgical History:   Procedure Laterality Date    ADENOIDECTOMY  1996    CERVICAL BIOPSY  W/ LOOP ELECTRODE EXCISION  2021    CONDYLOMA EXCISION/FULGURATION      DILATION AND CURETTAGE OF UTERUS  2021    HYSTEROSCOPY W/ POLYPECTOMY  2021    LAPAROSCOPY DIAGNOSTIC / BIOPSY / ASPIRATION / LYSIS  2006    for benign jose daniel-hepatic tumor    OTHER SURGICAL HISTORY      anal lesion excision    THORACIC OUTLET SURGERY  07/05/2022    TONSILLECTOMY  1996        Family History   Problem Relation Name Age of Onset    Other (Alcohol use disorder) Mother Mom     Atrial fibrillation Mother Mom     Skin cancer Mother Mom     Heart disease Mother Mom     Hernia Mother Mom     Miscarriages / Stillbirths Mother Mom     Hyperlipidemia Mother Mom     Hypothyroidism Mother Mom     Insulin resistance Mother Mom     Alcohol abuse Mother Mom     Cancer Mother Mom     Other (Alcohol use disorder) Father Dad      Other (drug use disorder) Father Dad     Early natural death Father Dad     Heart disease Father Dad     Alcohol abuse Father Dad     Drug abuse Father Dad     Other (alcohol use disorder) Sister Sister     Other (drug use disorder) Sister Sister     Genetic Testing Sister Sister     Mental illness Sister Sister     Miscarriages / Stillbirths Sister Sister     Lupus Sister Sister     Asthma Sister Sister     Alcohol abuse Sister Sister     Drug abuse Sister Sister     Breast cancer Maternal Grandmother Grams     Miscarriages / Stillbirths Maternal Grandmother Grams     Heart disease Maternal Grandmother Grams     Thyroid disease Maternal Grandmother Grams     Drug abuse Maternal Grandmother Grams     Alcohol abuse Maternal Grandmother Grams     Skin cancer Maternal Grandmother Grams     Heart disease Maternal Grandfather      Hypertension Maternal Grandfather      Drug abuse Maternal Grandfather      Alcohol abuse Maternal Grandfather      Other (bladder cancer) Maternal Grandfather      Other (Drug use disorder) Paternal Grandmother      Other (alcohol use disorder) Paternal Grandmother      Breast cancer Paternal Grandmother      Stroke Paternal Grandfather      Other (drug use disorder) Paternal Grandfather      Other (alcohol use disorder) Paternal Grandfather      Miscarriages / Stillbirths Mother's Sister Aunt l     Breast cancer Mother's Sister Aunt l     Breast cancer Mother's Brother      Breast cancer Maternal Cousin      Breast cancer Maternal Cousin      Breast cancer Maternal Cousin      Breast cancer Maternal Cousin          Social History     Socioeconomic History    Marital status: Single     Spouse name: Not on file    Number of children: Not on file    Years of education: Not on file    Highest education level: Not on file   Occupational History    Not on file   Tobacco Use    Smoking status: Former     Current packs/day: 0.00     Average packs/day: 0.3 packs/day for 2.0 years (0.5 ttl pk-yrs)      Types: Cigarettes     Start date: 2022     Quit date: 2024     Years since quittin.3    Smokeless tobacco: Never    Tobacco comments:     Quit for 10 years then started again. Quitting again   Vaping Use    Vaping status: Some Days    Substances: Nicotine, THC, Flavoring    Devices: Disposable   Substance and Sexual Activity    Alcohol use: Not Currently     Comment: socially-rarely    Drug use: Yes     Frequency: 1.0 times per week     Types: Marijuana     Comment: Only a few days a month on bad cramp days: gummies    Sexual activity: Yes     Partners: Female, Male     Birth control/protection: None   Other Topics Concern    Not on file   Social History Narrative    Not on file     Social Determinants of Health     Financial Resource Strain: Low Risk  (2021)    Received from [facility]    Overall Financial Resource Strain (CARDIA)     Difficulty of Paying Living Expenses: Not hard at all   Food Insecurity: No Food Insecurity (2021)    Received from [facility]    Hunger Vital Sign     Worried About Running Out of Food in the Last Year: Never true     Ran Out of Food in the Last Year: Never true   Transportation Needs: Not on file   Physical Activity: Not on file   Stress: Not on file   Social Connections: Not on file   Intimate Partner Violence: Not on file   Housing Stability: Not on file        Allergies   Allergen Reactions    Sulfa (Sulfonamide Antibiotics) Anaphylaxis         Review of Systems  Gen: No fatigue, anorexia, insomnia, fever.   Eyes: No vision loss, double vision, drainage, eye pain.   ENT: No pharyngitis, dry mouth, no hearing changes or ear discharge  Cardiac: No chest pain, palpitations, syncope, near syncope.   Pulmonary: No shortness of breath, cough, hemoptysis.   Heme/lymph: No swollen glands, fever, bleeding.   GI: No abdominal pain, change in bowel habits, melena, hematemesis, hematochezia, nausea, vomiting, diarrhea.   :  No discharge, dysuria, frequency, urgency, hematuria.  Endo: No polyuria or weight loss.   Musculoskeletal: Negative for any pain or loss of ROM/weakness  Skin: No rashes or lesions  Neuro: Normal speech, no numbness or weakness. No gait difficulties  Review of systems is otherwise negative unless stated above or in history of present illness.    Physical Exam:  Constitutional:  no distress, alert and cooperative  Eyes: clear sclera  Head/Neck: No apparent injury, trachea midline  Respiratory/Thorax: Patent airways, thorax symmetric, breathing comfortably  Cardiovascular: no pitting edema  Gastrointestinal: Nondistended  Musculoskeletal: ROM intact  Extremities: no clubbing  Neurological: alert, levy x4  Psychological: Appropriate affect    No results found for this or any previous visit (from the past 24 hour(s)).     Tracie Malagon is a 34 y.o. year old female patient who presents for laparoscopic total hysterectomy and BSO with Dr. Srivastava on 6/7 24. Acute Pain consulted for block for postoperative pain control.     Plan:    - bilateral QL single shot blocks performed preoperatively on 6/7/24  - Please be aware of local anesthetic toxic dose and absorption variability before considering lidocaine patches  - Acute pain service will see pt on POD1  - Rest of pain management per primary team    Acute Pain Resident  pg [phone] ph [phone]

## 2024-06-07 NOTE — ANESTHESIA PREPROCEDURE EVALUATION
Patient: Tracie Malagon    Procedure Information       Anesthesia Start Date/Time: 06/07/24 0710    Procedure: Laparoscopic Assisted Total Hysterectomy; Bilateral Salpingectomy (Bilateral)    Location: [facility] OR 02 / Virtual [facility] OR    Surgeons: Anna Srivastava MD            Relevant Problems   GYN   (+) Endometriosis   (+) Intramural leiomyoma of uterus   (+) Menorrhagia with regular cycle       Clinical information reviewed:   Tobacco  Allergies  Meds   Med Hx  Surg Hx  OB Status  Fam Hx  Soc   Hx        NPO Detail:  NPO/Void Status  Date of Last Liquid: 06/06/24  Time of Last Liquid: 2200  Date of Last Solid: 06/06/24  Time of Last Solid: 2200  Time of Last Void: 0630         Physical Exam    Airway  Mallampati: I  TM distance: >3 FB  Neck ROM: full     Cardiovascular - normal exam     Dental - normal exam     Pulmonary - normal exam     Abdominal - normal exam         Anesthesia Plan    History of general anesthesia?: yes  History of complications of general anesthesia?: no    ASA 2     general     The patient is not a current smoker.  Patient was not previously instructed to abstain from smoking on day of procedure.  Patient did not smoke on day of procedure.    intravenous induction   Postoperative administration of opioids is intended.  Anesthetic plan and risks discussed with patient.  Use of blood products discussed with patient who.

## 2024-06-07 NOTE — PROCEDURES
Peripheral Block    Patient location during procedure: pre-op  Start time: 6/7/2024 6:55 AM  End time: 6/7/2024 7:10 AM  Reason for block: at surgeon's request and post-op pain management  Staffing  Performed: resident   Authorized by: Gloria Sanchez MD    Performed by: Gloria Sanchez MD  Preanesthetic Checklist  Completed: patient identified, IV checked, site marked, risks and benefits discussed, surgical consent, monitors and equipment checked, pre-op evaluation and timeout performed   Timeout performed at: 6/7/2024 6:55 AM  Peripheral Block  Patient position: laying flat  Prep: ChloraPrep  Patient monitoring: heart rate and continuous pulse ox  Block type: QL  Injection technique: single-shot  Guidance: ultrasound guided  Local infiltration: ropivacaine  Infiltration strength: 0.5 %  Dose: 30 mL  Needle  Needle type: Tuohy   Needle gauge: 26 G  Needle length: 8 cm  Needle localization: ultrasound guidance     image stored in chart  Assessment  Injection assessment: negative aspiration for heme, no paresthesia on injection, incremental injection and local visualized surrounding nerve on ultrasound  Additional Notes  QL single shot. informed consent obtained. risks and benefits discussed. ASA monitors placed, timeout performed. Pt positioned, prepped with chlorhexidine, draped with sterile towels. Ultrasound guidance used with visualization of the needle throughout duration of the procedure. Aspiration was negative. A total of 30 cc 0.5% ropivacaine, 50mcg epinephrine, and 4mg decadron and 16mcg precedex  injected between both sides. Patient tolerated procedure well.     Timeout by Precious

## 2024-06-07 NOTE — H&P
History Of Present Illness  Tracie Malagon is a 34 y.o. female presenting for TLH, BS for AUB    Imaging:   - Recent pelvic US showed uterus measuring 8cm x 7cm x 8cm. Central dominant fibroid measuring 6cm x 6cm x 5cm.   - MRI 5/2024:   IMPRESSION:  1.  7.6 cm anterior uterine fibroid with some submucosal extension.  2.  Unremarkable ovaries.  3. No findings of pelvic endometriosis.    Labs:   Hgb 12.7, plts 322, Cr 0.74    Ob Hx:  - G0  Gyn Hx:  - Regular monthly menses w/ heavy flow; empties a menstrual cup hourly on heavy days and passage of golf ball size clots.   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   - Recent EMB benign w/ polyp   PMHx: thoracic outlet syndrome (resolved s/p removal of single rib and anterior scalene muscle), ADHD  PSHx: tonsillectomy, thoracic decompression surgery, diagnostic laparoscopy for benign jose daniel-hepatic tumor, D+C       Past Medical History  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 07/05/2022    now resolved s/p thoracic decompression    C4 cervical fracture (Multi)     LANDEN II (cervical intraepithelial neoplasia II) 2021    Dyscalculia     Endometriosis     Fibrocystic breast changes     Fibroid     GERD (gastroesophageal reflux disease)     Intramural leiomyoma of uterus     Migraines     Neurogenic thoracic outlet syndrome of right brachial plexus        Surgical History  Past Surgical History:   Procedure Laterality Date    ADENOIDECTOMY  1996    CERVICAL BIOPSY  W/ LOOP ELECTRODE EXCISION  2021    CONDYLOMA EXCISION/FULGURATION      DILATION AND CURETTAGE OF UTERUS  2021    HYSTEROSCOPY W/ POLYPECTOMY  2021    LAPAROSCOPY DIAGNOSTIC / BIOPSY / ASPIRATION / LYSIS  2006    for benign jose daniel-hepatic tumor    OTHER SURGICAL HISTORY      anal lesion excision    THORACIC OUTLET SURGERY   07/05/2022    TONSILLECTOMY  1996        Social History  She reports that she quit smoking about 4 months ago. Her smoking use included cigarettes. She started smoking about 2 years ago. She has a 0.5 pack-year smoking history. She has never used smokeless tobacco. She reports that she does not currently use alcohol. She reports current drug use. Frequency: 1.00 time per week. Drug: Marijuana.    Family History  Family History   Problem Relation Name Age of Onset    Other (Alcohol use disorder) Mother Mom     Atrial fibrillation Mother Mom     Skin cancer Mother Mom     Heart disease Mother Mom     Hernia Mother Mom     Miscarriages / Stillbirths Mother Mom     Hyperlipidemia Mother Mom     Hypothyroidism Mother Mom     Insulin resistance Mother Mom     Alcohol abuse Mother Mom     Cancer Mother Mom     Other (Alcohol use disorder) Father Dad     Other (drug use disorder) Father Dad     Early natural death Father Dad     Heart disease Father Dad     Alcohol abuse Father Dad     Drug abuse Father Dad     Other (alcohol use disorder) Sister Sister     Other (drug use disorder) Sister Sister     Genetic Testing Sister Sister     Mental illness Sister Sister     Miscarriages / Stillbirths Sister Sister     Lupus Sister Sister     Asthma Sister Sister     Alcohol abuse Sister Sister     Drug abuse Sister Sister     Breast cancer Maternal Grandmother Grams     Miscarriages / Stillbirths Maternal Grandmother Grams     Heart disease Maternal Grandmother Grams     Thyroid disease Maternal Grandmother Grams     Drug abuse Maternal Grandmother Grams     Alcohol abuse Maternal Grandmother Grams     Skin cancer Maternal Grandmother Grams     Heart disease Maternal Grandfather      Hypertension Maternal Grandfather      Drug abuse Maternal Grandfather      Alcohol abuse Maternal Grandfather      Other (bladder cancer) Maternal Grandfather      Other (Drug use disorder) Paternal Grandmother      Other (alcohol use disorder)  "Paternal Grandmother      Breast cancer Paternal Grandmother      Stroke Paternal Grandfather      Other (drug use disorder) Paternal Grandfather      Other (alcohol use disorder) Paternal Grandfather      Miscarriages / Stillbirths Mother's Sister Aunt l     Breast cancer Mother's Sister Aunt l     Breast cancer Mother's Brother      Breast cancer Maternal Cousin      Breast cancer Maternal Cousin      Breast cancer Maternal Cousin      Breast cancer Maternal Cousin       Allergies  Sulfa (sulfonamide antibiotics)    Review of Systems   All other systems reviewed and are negative.       Physical Exam  Constitutional:       Appearance: Normal appearance.   HENT:      Head: Normocephalic and atraumatic.      Nose: Nose normal.      Mouth/Throat:      Mouth: Mucous membranes are moist.      Pharynx: No oropharyngeal exudate or posterior oropharyngeal erythema.   Eyes:      Extraocular Movements: Extraocular movements intact.      Conjunctiva/sclera: Conjunctivae normal.   Pulmonary:      Effort: Pulmonary effort is normal.   Musculoskeletal:      Cervical back: Normal range of motion.   Skin:     Findings: No bruising, erythema, lesion or rash.   Neurological:      General: No focal deficit present.      Mental Status: She is alert.   Psychiatric:         Mood and Affect: Mood normal.         Behavior: Behavior normal.         Thought Content: Thought content normal.         Judgment: Judgment normal.          Last Recorded Vitals  Blood pressure 128/85, pulse 83, temperature 36.4 °C (97.5 °F), temperature source Temporal, resp. rate 17, height 1.778 m (5' 10\"), weight (!) 156 kg (343 lb 14.7 oz), SpO2 97%, not currently breastfeeding.  "

## 2024-06-07 NOTE — OP NOTE
Date: 2024  OR Location: [facility] OR    Name: Tracie Malagon, : 1990, Age: 34 y.o., MRN: [id number], Sex: female    Diagnosis  Pre-op Diagnosis     * Intramural leiomyoma of uterus [D25.1] Post-op Diagnosis     * Intramural leiomyoma of uterus [D25.1]     Procedures  Laparoscopic Assisted Total Hysterectomy; Bilateral Salpingectomy, CYSTOSCOPY  18356 - OH LAPAROSCOPY TOTAL HYSTERECTOMY UTERUS >250 GM      Surgeons      * Anna Srivastava - Primary    Resident/Fellow/Other Assistant:  Surgeons and Role:     * Merari Washington MD - Resident - Assisting    Procedure Summary  Anesthesia: General  ASA: II  Anesthesia Staff: Anesthesiologist: Shashi Paz MD  C-AA: AMBREEN Norman  Estimated Blood Loss: 50mL  Intra-op Medications: Administrations occurring from 0715 to 1015 on 24:  * No intraprocedure medications in log *         Anesthesia Record               Intraprocedure I/O Totals          Intake    Dexmedetomidine 0.00 mL    The total shown is the total volume documented since Anesthesia Start was filed.    Propofol Drip 0.00 mL    The total shown is the total volume documented since Anesthesia Start was filed.    lactated Ringer's 850.00 mL    metroNIDAZOLE 500 mg/100 mL 100.00 mL    Total Intake 950 mL       Output    Urine 140 mL    Total Blood Loss - Surgical Delivery (mL) 50 mL    NG/OG Tube Output 75 mL    Total Output 265 mL       Net    Net Volume 685 mL       Other (could not be determined as input or output)    Surgical Delivery Estimated Blood Loss (mL) 50          Specimen:   ID Type Source Tests Collected by Time   1 : UTERUS, CERVIX AND BILATERAL FALLOPIAN TUBES Tissue UTERUS, CERVIX, AND BILATERAL FALLOPIAN TUBES SURGICAL PATHOLOGY EXAM Anna Srivastava MD 2024 [id number]      Staff:   Circulator: Aileen Galindo Person: Juani  Circulator: Jesus Wu Circulator: Josue     Indications: 35yo woman undergoing TLH, BS and cystoscopy for AUB-L   Findings: Bulky fibroid  uterus filling entire pelvis, normal bilateral Fallopian tubes and ovaries. Normal upper abdominal survey. Uterine weight: 350 grams.     Description of Procedure  Patient was taken to the operating room where she was prepared and draped in the usual sterile fashion.  A [employer] uterine manipulator was placed.  A Renteria catheter was placed. Attention was then turned abdominally.  The base of the umbilicus was elevated using Kocher clamps. The skin was incised with a scalpel. The fascia was grasped with Kochers and entered sharply using a scalpel.  Peritoneum was bluntly opened using a Bibi clamp.  Intraperitoneal placement was confirmed via visualization of underlying bowel.     Qamar trocar was then placed at the umbilical entry site. Diagnostic laparoscopy was performed, and revealed findings as noted above.  No areas of trauma or injury were noted immediately inferior to the umbilicus. Complete abdominal survey was performed. The patient was then placed in steep Trendelenburg positioning.     Ancillary trocars were then placed under direct visualization. Three 5mm trocars were placed, one in the LLQ, one in the RLQ, and one trocar was placed suprapubically.     Attention was turned to the pelvis. The right fallopian tube was elevated away from the underlying structures.  The mesosalpinx was clamped, sealed, and transected using the LigaSure device.  The fallopian tube was  to the level of the uterine cornua.  The round ligament and utero-ovarian ligament were then clamped sealed and transected using the LigaSure device.  The broad ligament was then opened anteriorly and posteriorly to skeletonize the uterine vasculature.  The bladder flap was started using the LigaSure device.  The uterine vasculature was then clamped sealed and transected using the LigaSure device.  It was lateralized using the LigaSure device.      Attention was then turned to the left side of the pelvis, which was dissected in a similar  fashion.     Monopolar electrosurgery was then utilized to complete the bladder flap.  The bladder was dissected away from the lower uterine segment and cervix using blunt dissection and monopolar electrosurgery.  At this time care was taken to ensure that the bladder was well out of the operative field as well as the bilateral uterine pedicles. Colpotomy was then performed using monopolar electrosurgery.  The uterus was fully  from the vagina. The uterus was then placed in the upper abdomen.      The pelvis was inspected and noted be adequately hemostatic.  The vaginal cuff was then reapproximated laparoscopically using 0 V lock suture. Hemostasis was achieved using monopolar electrosurgery.  The Fallopian tubes were then removed w/ Ligasure electrosurgery.     The suprapubic port was then extended to 3cm. The uterus was placed in a bag, and the bag brought to the level of the abdomen at the suprapubic incision. Contained morcellation was then performed. Following morcellation, the suprapubic fascia was closed with a running suture of 0-Vicyrl. 3-0 Monocryl was placed to close the subcutaneous tissue.      The umbilical fascia was then closed with 0 Vicryl suture.  The skin was closed with 4-0 Monocryl.        The Renteria catheter was removed. Cystoscopy was performed and revealed intact bladder with bilateral ureteral jets and no evidence of trauma or foreign material.      The patient was awakened from general anesthesia and taken the recovery room in stable condition.     Complications:  None; patient tolerated the procedure well.     Disposition: PACU - hemodynamically stable.  Condition: stable    I was present for the entirety of the procedure(s).     Anna Srivastava MD

## 2024-06-10 ENCOUNTER — APPOINTMENT (OUTPATIENT)
Dept: ENDOCRINOLOGY | Facility: CLINIC | Age: 34
End: 2024-06-10
Payer: COMMERCIAL

## 2024-06-13 ENCOUNTER — LAB (OUTPATIENT)
Dept: LAB | Facility: LAB | Age: 34
End: 2024-06-13
Payer: COMMERCIAL

## 2024-06-13 DIAGNOSIS — R30.0 DYSURIA: ICD-10-CM

## 2024-06-13 DIAGNOSIS — G89.18 POST-OP PAIN: Primary | ICD-10-CM

## 2024-06-13 DIAGNOSIS — R30.0 DYSURIA: Primary | ICD-10-CM

## 2024-06-13 PROCEDURE — 87086 URINE CULTURE/COLONY COUNT: CPT

## 2024-06-13 RX ORDER — OXYCODONE HYDROCHLORIDE 5 MG/1
5 TABLET ORAL EVERY 6 HOURS PRN
Qty: 10 TABLET | Refills: 0 | Status: SHIPPED | OUTPATIENT
Start: 2024-06-13

## 2024-06-14 LAB — BACTERIA UR CULT: NORMAL

## 2024-06-16 ENCOUNTER — HOSPITAL ENCOUNTER (EMERGENCY)
Facility: HOSPITAL | Age: 34
Discharge: HOME | End: 2024-06-16
Attending: STUDENT IN AN ORGANIZED HEALTH CARE EDUCATION/TRAINING PROGRAM
Payer: COMMERCIAL

## 2024-06-16 VITALS
RESPIRATION RATE: 18 BRPM | HEART RATE: 82 BPM | BODY MASS INDEX: 41.02 KG/M2 | DIASTOLIC BLOOD PRESSURE: 83 MMHG | OXYGEN SATURATION: 98 % | TEMPERATURE: 98.1 F | WEIGHT: 293 LBS | SYSTOLIC BLOOD PRESSURE: 122 MMHG | HEIGHT: 71 IN

## 2024-06-16 DIAGNOSIS — Z48.89 ENCOUNTER FOR POSTOPERATIVE WOUND CHECK: Primary | ICD-10-CM

## 2024-06-16 DIAGNOSIS — T81.30XA WOUND DEHISCENCE: ICD-10-CM

## 2024-06-16 LAB
ALBUMIN SERPL BCP-MCNC: 4.4 G/DL (ref 3.4–5)
ALP SERPL-CCNC: 62 U/L (ref 33–110)
ALT SERPL W P-5'-P-CCNC: 25 U/L (ref 7–45)
ANION GAP SERPL CALC-SCNC: 12 MMOL/L (ref 10–20)
APPEARANCE UR: CLEAR
AST SERPL W P-5'-P-CCNC: 16 U/L (ref 9–39)
BASOPHILS # BLD AUTO: 0.05 X10*3/UL (ref 0–0.1)
BASOPHILS NFR BLD AUTO: 0.8 %
BILIRUB SERPL-MCNC: 0.3 MG/DL (ref 0–1.2)
BILIRUB UR STRIP.AUTO-MCNC: NEGATIVE MG/DL
BUN SERPL-MCNC: 11 MG/DL (ref 6–23)
CALCIUM SERPL-MCNC: 9.4 MG/DL (ref 8.6–10.3)
CHLORIDE SERPL-SCNC: 104 MMOL/L (ref 98–107)
CO2 SERPL-SCNC: 26 MMOL/L (ref 21–32)
COLOR UR: NORMAL
CREAT SERPL-MCNC: 0.67 MG/DL (ref 0.5–1.05)
EGFRCR SERPLBLD CKD-EPI 2021: >90 ML/MIN/1.73M*2
EOSINOPHIL # BLD AUTO: 0.19 X10*3/UL (ref 0–0.7)
EOSINOPHIL NFR BLD AUTO: 3.1 %
ERYTHROCYTE [DISTWIDTH] IN BLOOD BY AUTOMATED COUNT: 12.1 % (ref 11.5–14.5)
GLUCOSE SERPL-MCNC: 102 MG/DL (ref 74–99)
GLUCOSE UR STRIP.AUTO-MCNC: NORMAL MG/DL
HCT VFR BLD AUTO: 37.8 % (ref 36–46)
HGB BLD-MCNC: 12.6 G/DL (ref 12–16)
IMM GRANULOCYTES # BLD AUTO: 0.03 X10*3/UL (ref 0–0.7)
IMM GRANULOCYTES NFR BLD AUTO: 0.5 % (ref 0–0.9)
KETONES UR STRIP.AUTO-MCNC: NEGATIVE MG/DL
LACTATE SERPL-SCNC: 1 MMOL/L (ref 0.4–2)
LACTATE SERPL-SCNC: 2.1 MMOL/L (ref 0.4–2)
LEUKOCYTE ESTERASE UR QL STRIP.AUTO: NEGATIVE
LYMPHOCYTES # BLD AUTO: 1.87 X10*3/UL (ref 1.2–4.8)
LYMPHOCYTES NFR BLD AUTO: 30.7 %
MCH RBC QN AUTO: 30.6 PG (ref 26–34)
MCHC RBC AUTO-ENTMCNC: 33.3 G/DL (ref 32–36)
MCV RBC AUTO: 92 FL (ref 80–100)
MONOCYTES # BLD AUTO: 0.55 X10*3/UL (ref 0.1–1)
MONOCYTES NFR BLD AUTO: 9 %
NEUTROPHILS # BLD AUTO: 3.4 X10*3/UL (ref 1.2–7.7)
NEUTROPHILS NFR BLD AUTO: 55.9 %
NITRITE UR QL STRIP.AUTO: NEGATIVE
NRBC BLD-RTO: 0 /100 WBCS (ref 0–0)
PH UR STRIP.AUTO: 5.5 [PH]
PLATELET # BLD AUTO: 351 X10*3/UL (ref 150–450)
POTASSIUM SERPL-SCNC: 4.4 MMOL/L (ref 3.5–5.3)
PROT SERPL-MCNC: 7.2 G/DL (ref 6.4–8.2)
PROT UR STRIP.AUTO-MCNC: NEGATIVE MG/DL
RBC # BLD AUTO: 4.12 X10*6/UL (ref 4–5.2)
RBC # UR STRIP.AUTO: NEGATIVE /UL
SODIUM SERPL-SCNC: 138 MMOL/L (ref 136–145)
SP GR UR STRIP.AUTO: 1.02
UROBILINOGEN UR STRIP.AUTO-MCNC: NORMAL MG/DL
WBC # BLD AUTO: 6.1 X10*3/UL (ref 4.4–11.3)

## 2024-06-16 PROCEDURE — 81003 URINALYSIS AUTO W/O SCOPE: CPT | Performed by: NURSE PRACTITIONER

## 2024-06-16 PROCEDURE — 80053 COMPREHEN METABOLIC PANEL: CPT | Performed by: NURSE PRACTITIONER

## 2024-06-16 PROCEDURE — 96360 HYDRATION IV INFUSION INIT: CPT

## 2024-06-16 PROCEDURE — 36415 COLL VENOUS BLD VENIPUNCTURE: CPT | Performed by: NURSE PRACTITIONER

## 2024-06-16 PROCEDURE — 2500000004 HC RX 250 GENERAL PHARMACY W/ HCPCS (ALT 636 FOR OP/ED): Performed by: NURSE PRACTITIONER

## 2024-06-16 PROCEDURE — 2500000001 HC RX 250 WO HCPCS SELF ADMINISTERED DRUGS (ALT 637 FOR MEDICARE OP): Performed by: NURSE PRACTITIONER

## 2024-06-16 PROCEDURE — 99283 EMERGENCY DEPT VISIT LOW MDM: CPT | Mod: 25

## 2024-06-16 PROCEDURE — 83605 ASSAY OF LACTIC ACID: CPT | Performed by: NURSE PRACTITIONER

## 2024-06-16 PROCEDURE — 85025 COMPLETE CBC W/AUTO DIFF WBC: CPT | Performed by: NURSE PRACTITIONER

## 2024-06-16 RX ORDER — CEPHALEXIN 250 MG/1
500 CAPSULE ORAL ONCE
Status: COMPLETED | OUTPATIENT
Start: 2024-06-16 | End: 2024-06-16

## 2024-06-16 RX ORDER — CEPHALEXIN 500 MG/1
500 CAPSULE ORAL 4 TIMES DAILY
Qty: 28 CAPSULE | Refills: 0 | Status: SHIPPED | OUTPATIENT
Start: 2024-06-16 | End: 2024-06-23

## 2024-06-16 ASSESSMENT — PAIN DESCRIPTION - LOCATION
LOCATION: INCISION
LOCATION: ABDOMEN

## 2024-06-16 ASSESSMENT — PAIN DESCRIPTION - PAIN TYPE
TYPE: SURGICAL PAIN
TYPE: ACUTE PAIN

## 2024-06-16 ASSESSMENT — PAIN DESCRIPTION - ORIENTATION: ORIENTATION: LOWER

## 2024-06-16 ASSESSMENT — COLUMBIA-SUICIDE SEVERITY RATING SCALE - C-SSRS
2. HAVE YOU ACTUALLY HAD ANY THOUGHTS OF KILLING YOURSELF?: NO
1. IN THE PAST MONTH, HAVE YOU WISHED YOU WERE DEAD OR WISHED YOU COULD GO TO SLEEP AND NOT WAKE UP?: NO
6. HAVE YOU EVER DONE ANYTHING, STARTED TO DO ANYTHING, OR PREPARED TO DO ANYTHING TO END YOUR LIFE?: NO

## 2024-06-16 ASSESSMENT — PAIN SCALES - GENERAL
PAINLEVEL_OUTOF10: 4
PAINLEVEL_OUTOF10: 9

## 2024-06-16 ASSESSMENT — PAIN DESCRIPTION - ONSET: ONSET: GRADUAL

## 2024-06-16 ASSESSMENT — PAIN - FUNCTIONAL ASSESSMENT: PAIN_FUNCTIONAL_ASSESSMENT: 0-10

## 2024-06-16 ASSESSMENT — PAIN DESCRIPTION - PROGRESSION: CLINICAL_PROGRESSION: NOT CHANGED

## 2024-06-16 ASSESSMENT — PAIN DESCRIPTION - FREQUENCY: FREQUENCY: CONSTANT/CONTINUOUS

## 2024-06-16 NOTE — Clinical Note
Tracie Malagon was seen and treated in our emergency department on 6/16/2024.  She may return to work on 06/19/2024.       If you have any questions or concerns, please don't hesitate to call.      Kelsey Blanco, APRN-CNP

## 2024-06-16 NOTE — ED PROVIDER NOTES
"    HPI   Chief Complaint   Patient presents with    Wound Dehiscence     Pt has a hysterectomy on 6-7 and today incision popped and steri stripes came off. No bledding or drainade noted       Patient presents the emergency department for evaluation of wound dehiscence that occurred just prior to arrival.  Patient has a history of laparoscopic hysterectomy on June 7 with Dr. Srivastava secondary to a uterine fibroid.  She has not had any recent fever, chills, myalgias, malaise, nausea, vomiting, black or bloody stools, dysuria, hematuria, vaginal bleeding, vaginal discharge, odor, back pain, calf pain, leg swelling, chest pain, shortness of breath or hemoptysis.  She reports her incisions to have been doing great with the Steri-Strips on the lateral incision sites falling off a few days ago.  In the shower today, her suprapubic incision Steri-Stripped was hanging partly off therefore she pulled it the rest of the way off.  Then when she arched backwards to wash her hair, she felt a pop and a \"searing pain.\"  Upon looking down, she noticed an opening in her incision.  There is been no drainage from the area and there is been no redness however it does not look like the fine line that it look like prior.  She took 2 Tylenol prior to coming to the emergency department as referred by the nurse line.      History provided by:  Patient   used: No        INFORMED PHOTO CONSENT:    The patient has given verbal consent to have photos taken of her incision and inserted into their provider note as a part of their permanent medical record for purposes of documentation, treatment management, and/or medical review. All images taken were transmitted and stored on a secure Epic  Site located within a Media Folder Tab by a registered Epic-[employer] Mobile Application Device. See \"Media\" tab in Epic or photo as below.                    [place] Coma Scale Score: 15                  Patient History   Past Medical " History:   Diagnosis Date    ADHD (attention deficit hyperactivity disorder)     Anxiety     Arterial thoracic outlet syndrome of right subclavian artery 07/05/2022    now resolved s/p thoracic decompression    C4 cervical fracture (Multi)     LANDEN II (cervical intraepithelial neoplasia II) 2021    Dyscalculia     Endometriosis     Fibrocystic breast changes     Fibroid     GERD (gastroesophageal reflux disease)     Intramural leiomyoma of uterus     Migraines     Neurogenic thoracic outlet syndrome of right brachial plexus      Past Surgical History:   Procedure Laterality Date    ADENOIDECTOMY  1996    CERVICAL BIOPSY  W/ LOOP ELECTRODE EXCISION  2021    CONDYLOMA EXCISION/FULGURATION      DILATION AND CURETTAGE OF UTERUS  2021    HYSTEROSCOPY W/ POLYPECTOMY  2021    LAPAROSCOPY DIAGNOSTIC / BIOPSY / ASPIRATION / LYSIS  2006    for benign jose daniel-hepatic tumor    OTHER SURGICAL HISTORY      anal lesion excision    THORACIC OUTLET SURGERY  07/05/2022    TONSILLECTOMY  1996     Family History   Problem Relation Name Age of Onset    Other (Alcohol use disorder) Mother Mom     Atrial fibrillation Mother Mom     Skin cancer Mother Mom     Heart disease Mother Mom     Hernia Mother Mom     Miscarriages / Stillbirths Mother Mom     Hyperlipidemia Mother Mom     Hypothyroidism Mother Mom     Insulin resistance Mother Mom     Alcohol abuse Mother Mom     Cancer Mother Mom     Other (Alcohol use disorder) Father Dad     Other (drug use disorder) Father Dad     Early natural death Father Dad     Heart disease Father Dad     Alcohol abuse Father Dad     Drug abuse Father Dad     Other (alcohol use disorder) Sister Sister     Other (drug use disorder) Sister Sister     Genetic Testing Sister Sister     Mental illness Sister Sister     Miscarriages / Stillbirths Sister Sister     Lupus Sister Sister     Asthma Sister Sister     Alcohol abuse Sister Sister     Drug abuse Sister Sister     Breast cancer Maternal Grandmother Grams      "Miscarriages / Stillbirths Maternal Grandmother Grams     Heart disease Maternal Grandmother Grams     Thyroid disease Maternal Grandmother Grams     Drug abuse Maternal Grandmother Grams     Alcohol abuse Maternal Grandmother Grams     Skin cancer Maternal Grandmother Grams     Heart disease Maternal Grandfather      Hypertension Maternal Grandfather      Drug abuse Maternal Grandfather      Alcohol abuse Maternal Grandfather      Other (bladder cancer) Maternal Grandfather      Other (Drug use disorder) Paternal Grandmother      Other (alcohol use disorder) Paternal Grandmother      Breast cancer Paternal Grandmother      Stroke Paternal Grandfather      Other (drug use disorder) Paternal Grandfather      Other (alcohol use disorder) Paternal Grandfather      Miscarriages / Stillbirths Mother's Sister Aunt l     Breast cancer Mother's Sister Aunt l     Breast cancer Mother's Brother      Breast cancer Maternal Cousin      Breast cancer Maternal Cousin      Breast cancer Maternal Cousin      Breast cancer Maternal Cousin       Social History     Tobacco Use    Smoking status: Former     Current packs/day: 0.00     Average packs/day: 0.3 packs/day for 2.0 years (0.5 ttl pk-yrs)     Types: Cigarettes     Start date: 2022     Quit date: 2024     Years since quittin.3    Smokeless tobacco: Never    Tobacco comments:     Quit for 10 years then started again. Quitting again   Vaping Use    Vaping status: Some Days    Substances: Nicotine, THC, Flavoring    Devices: Disposable   Substance Use Topics    Alcohol use: Not Currently     Comment: socially-rarely    Drug use: Yes     Frequency: 1.0 times per week     Types: Marijuana     Comment: Only a few days a month on bad cramp days: gummies       Physical Exam   Visit Vitals  /84 (BP Location: Right arm, Patient Position: Sitting)   Pulse 76   Temp 36.7 °C (98.1 °F)   Resp 15   Ht 1.803 m (5' 11\")   Wt 150 kg (330 lb)   SpO2 98%   BMI 46.03 kg/m²   OB " Status Having periods   Smoking Status Former   BSA 2.74 m²      Physical Exam  Vitals reviewed.   Constitutional:       General: She is not in acute distress.     Appearance: Normal appearance.   HENT:      Head: Normocephalic and atraumatic.      Mouth/Throat:      Lips: Pink.      Mouth: Mucous membranes are moist.      Pharynx: Oropharynx is clear.   Cardiovascular:      Rate and Rhythm: Normal rate and regular rhythm.      Pulses:           Radial pulses are 2+ on the right side.      Heart sounds: No murmur heard.     Comments: No resting tachycardia on exam, HR 90 bpm. No calf tenderness, palpable cords or localized foot/ankle edema bilaterally.   Pulmonary:      Effort: Pulmonary effort is normal.      Breath sounds: Normal breath sounds.   Abdominal:      General: A surgical scar is present. Bowel sounds are normal.      Palpations: Abdomen is soft.      Tenderness: There is abdominal tenderness in the suprapubic area. There is no right CVA tenderness or left CVA tenderness.          Comments: The 2 lateral incision sites and the periumbilical incision site are well-appearing, approximated without dehiscence, no erythema, odor or drainage.  These are nontender to palpation.   Genitourinary:     Comments: deferred  Musculoskeletal:      Cervical back: Full passive range of motion without pain and neck supple.      Comments: TSANG randomly.   Skin:     General: Skin is warm and dry.      Capillary Refill: Capillary refill takes less than 2 seconds.   Neurological:      General: No focal deficit present.      Mental Status: She is alert and oriented to person, place, and time.         No orders to display       Labs Reviewed   COMPREHENSIVE METABOLIC PANEL - Abnormal       Result Value    Glucose 102 (*)     Sodium 138      Potassium 4.4      Chloride 104      Bicarbonate 26      Anion Gap 12      Urea Nitrogen 11      Creatinine 0.67      eGFR >90      Calcium 9.4      Albumin 4.4      Alkaline Phosphatase 62       Total Protein 7.2      AST 16      Bilirubin, Total 0.3      ALT 25     LACTATE - Abnormal    Lactate 2.1 (*)     Narrative:     Venipuncture immediately after or during the administration of Metamizole may lead to falsely low results. Testing should be performed immediately  prior to Metamizole dosing.   URINALYSIS WITH REFLEX CULTURE AND MICROSCOPIC - Normal    Color, Urine Light-Yellow      Appearance, Urine Clear      Specific Gravity, Urine 1.017      pH, Urine 5.5      Protein, Urine NEGATIVE      Glucose, Urine Normal      Blood, Urine NEGATIVE      Ketones, Urine NEGATIVE      Bilirubin, Urine NEGATIVE      Urobilinogen, Urine Normal      Nitrite, Urine NEGATIVE      Leukocyte Esterase, Urine NEGATIVE     LACTATE - Normal    Lactate 1.0      Narrative:     Venipuncture immediately after or during the administration of Metamizole may lead to falsely low results. Testing should be performed immediately  prior to Metamizole dosing.   CBC WITH AUTO DIFFERENTIAL    WBC 6.1      nRBC 0.0      RBC 4.12      Hemoglobin 12.6      Hematocrit 37.8      MCV 92      MCH 30.6      MCHC 33.3      RDW 12.1      Platelets 351      Neutrophils % 55.9      Immature Granulocytes %, Automated 0.5      Lymphocytes % 30.7      Monocytes % 9.0      Eosinophils % 3.1      Basophils % 0.8      Neutrophils Absolute 3.40      Immature Granulocytes Absolute, Automated 0.03      Lymphocytes Absolute 1.87      Monocytes Absolute 0.55      Eosinophils Absolute 0.19      Basophils Absolute 0.05     URINALYSIS WITH REFLEX CULTURE AND MICROSCOPIC    Narrative:     The following orders were created for panel order Urinalysis with Reflex Culture and Microscopic.  Procedure                               Abnormality         Status                     ---------                               -----------         ------                     Urinalysis with Reflex C...[[id number]]  Normal              Final result               Extra Urine Bermeo  Tube[[id number]]                            In process                   Please view results for these tests on the individual orders.   EXTRA URINE GRAY TUBE       ED Course & MDM     Medical Decision Making  Patient presents to the emergency department for evaluation of wound dehiscence.  Patient was in the shower when she pulled off her Steri-Strip, then upon arching her pack to wash her hair, her wound dehisced.  There is minimal opening of the incision with localized erythema.  Patient is documented to have a tachycardic rate although not on exam.  She does not have clinical appearance of DVT and low suspicion for PE given she has no hemoptysis, chest pain or shortness of breath although this was considered.  Given this and her recent surgical procedure, plan is to check some baseline labs to ensure no need for diagnostic imaging which is not apparent on exam.  She is taken Tylenol prior to her visit.  Plan is to reinforce after wound care with tincture of benzoin and Steri-Strip with close outpatient follow-up.        ED Course as of 06/16/24 1804   Sun Jun 16, 2024   1556 LEUKOCYTES (10*3/UL) IN BLOOD BY AUTOMATED COUNT, [place]: 6.1 [NA]   1613 Lactate(!): 2.1 [NA]   1615 Wound care completed. Patient up to the restroom for urine specimen. One liter IVF bolus for elevated lactate. Given her normal lactate, no fever and no increased respirations with localized incisional tenderness, plan is for no diagnostic imaging at this time. Will give IVF, repeat lactate and if trending downward, will discharge on oral keflex and outpatient follow up.  [NA]   1801 Lactate: 1.0 [NA]   1801 Repeat lactate 1.0 and patient agrees with no diagnostic imaging today of a CT of the abdomen pelvis with IV contrast.  She would like [facility] [address] for her antibiotic and is given a dose of cephalexin 500 mg here in ED and will start 4 times daily dosing while awaiting follow-up with her surgeon which she will call tomorrow to arrange.   We discussed wound care with daily shower, Steri-Strips to remain on until they fall off or until directed otherwise by her surgeon.  Return precautions discussed.  Patient is well-appearing, nontoxic and appropriate for outpatient treatment and management which she prefers.  She voices no social determinants of health that would obscure this outpatient management plan and departed ambulatory in stable condition. [NA]      ED Course User Index  [NA] Kelsey Blanco, APRN-CNP         Diagnoses as of 06/16/24 1804   Encounter for postoperative wound check   Wound dehiscence          Your medication list        START taking these medications        Instructions Last Dose Given Next Dose Due   cephalexin 500 mg capsule  Commonly known as: Keflex      Take 1 capsule (500 mg) by mouth 4 times a day for 7 days.              ASK your doctor about these medications        Instructions Last Dose Given Next Dose Due   acetaminophen 500 mg tablet  Commonly known as: Tylenol      Take 2 tablets (1,000 mg) by mouth every 6 hours if needed for mild pain (1 - 3).       ibuprofen 600 mg tablet      Take 1 tablet (600 mg) by mouth every 6 hours if needed for mild pain (1 - 3).       multivitamin tablet           ondansetron ODT 4 mg disintegrating tablet  Commonly known as: Zofran-ODT      Take 1 tablet (4 mg) by mouth every 4 hours if needed for nausea or vomiting.       oxyCODONE 5 mg immediate release tablet  Commonly known as: Roxicodone      Take 1 tablet (5 mg) by mouth every 6 hours if needed for moderate pain (4 - 6) or severe pain (7 - 10).       oxyCODONE 5 mg immediate release tablet  Commonly known as: Roxicodone      Take 1 tablet (5 mg) by mouth every 6 hours if needed for severe pain (7 - 10).       sennosides 8.6 mg tablet  Commonly known as: Senokot      Take 1 tablet (8.6 mg) by mouth 2 times a day. Take twice a day until first bowel movement                 Where to Get Your Medications        These medications  "were sent to [facility]/pharmacy #[id number] - [place], [place] - [address], [facility] [address]      Phone: [phone]   cephalexin 500 mg capsule         Procedure  TIME: 1615    WOUND CARE    Indication: wound dehiscence  Performed By: Kelsey Blanco CNP  Risks, benefits and alternatives for the applicable procedure described. Opportunity for questions and answers provided to allow for informed decision making.  Consent: Verbal consent was obtained by the patient    LOCATION: abdomen  LENGTH: 4.5 cm  THICKNESS: Superficial    Anesthesia was not required. The wound was cleaned with wound cleanser and irrigated with normal saline under pressure. The wound was explored and there are no foreign bodies. The wound was reinforced with mastisol liquid adhesive applied to the borders of the wound then 6, 1/4\"steri-strips . Patient was neurovascularly intact before and after the procedure. They tolerated the procedure well without complications. Wound care and return precautions discussed with understanding verbalized.        *This report was transcribed using voice recognition software.  Every effort was made to ensure accuracy; however, inadvertent computerized transcription errors may be present.*  CATA Garcia-DENY  06/16/24         CATA Garcia-CNP  06/16/24 1805    "

## 2024-06-17 LAB
HOLD SPECIMEN: NORMAL
LABORATORY COMMENT REPORT: NORMAL
PATH REPORT.FINAL DX SPEC: NORMAL
PATH REPORT.GROSS SPEC: NORMAL
PATH REPORT.RELEVANT HX SPEC: NORMAL
PATH REPORT.TOTAL CANCER: NORMAL
RESIDENT REVIEW: NORMAL

## 2024-06-18 DIAGNOSIS — B37.31 YEAST INFECTION OF THE VAGINA: ICD-10-CM

## 2024-06-19 RX ORDER — FLUCONAZOLE 150 MG/1
150 TABLET ORAL ONCE
Qty: 2 TABLET | Refills: 0 | Status: SHIPPED | OUTPATIENT
Start: 2024-06-19 | End: 2024-06-19

## 2024-06-20 ENCOUNTER — OFFICE VISIT (OUTPATIENT)
Dept: OBSTETRICS AND GYNECOLOGY | Facility: CLINIC | Age: 34
End: 2024-06-20
Payer: COMMERCIAL

## 2024-06-20 VITALS
BODY MASS INDEX: 47.98 KG/M2 | WEIGHT: 293 LBS | HEART RATE: 89 BPM | DIASTOLIC BLOOD PRESSURE: 96 MMHG | SYSTOLIC BLOOD PRESSURE: 133 MMHG

## 2024-06-20 DIAGNOSIS — G89.18 POST-OP PAIN: ICD-10-CM

## 2024-06-20 DIAGNOSIS — R11.0 NAUSEA: ICD-10-CM

## 2024-06-20 DIAGNOSIS — Z48.89 POSTOPERATIVE VISIT: Primary | ICD-10-CM

## 2024-06-20 PROCEDURE — 99024 POSTOP FOLLOW-UP VISIT: CPT | Performed by: STUDENT IN AN ORGANIZED HEALTH CARE EDUCATION/TRAINING PROGRAM

## 2024-06-20 PROCEDURE — 3008F BODY MASS INDEX DOCD: CPT | Performed by: STUDENT IN AN ORGANIZED HEALTH CARE EDUCATION/TRAINING PROGRAM

## 2024-06-20 RX ORDER — ONDANSETRON 4 MG/1
4 TABLET, ORALLY DISINTEGRATING ORAL EVERY 8 HOURS PRN
Qty: 20 TABLET | Refills: 0 | Status: SHIPPED | OUTPATIENT
Start: 2024-06-20 | End: 2024-06-27

## 2024-06-20 RX ORDER — LIDOCAINE 50 MG/G
1 PATCH TOPICAL DAILY
Qty: 30 PATCH | Refills: 1 | Status: SHIPPED | OUTPATIENT
Start: 2024-06-20

## 2024-06-20 ASSESSMENT — ENCOUNTER SYMPTOMS
CARDIOVASCULAR NEGATIVE: 0
CONSTITUTIONAL NEGATIVE: 0
MUSCULOSKELETAL NEGATIVE: 0
GASTROINTESTINAL NEGATIVE: 0
PSYCHIATRIC NEGATIVE: 0
EYES NEGATIVE: 0
ENDOCRINE NEGATIVE: 0
ALLERGIC/IMMUNOLOGIC NEGATIVE: 0
RESPIRATORY NEGATIVE: 0
NEUROLOGICAL NEGATIVE: 0
HEMATOLOGIC/LYMPHATIC NEGATIVE: 0

## 2024-06-20 ASSESSMENT — PAIN SCALES - GENERAL: PAINLEVEL: 3

## 2024-06-20 NOTE — PROGRESS NOTES
Division of Minimally Invasive Gynecologic Surgery  [facility]    06/19/24 Gynecology Visit    Tracie Malagon is a 34 y.o. status post TLH-BS on 6/7/2024 presents for post op incision check.     Initially presented to ED for concerns about suprapubic incision on 6/16. Started on Keflex at that time. Since then, she has been washing incision w/ soap/water once per day and twice per day doing a rinse w/ dilute hydrogen peroxide. She reports it is starting to look better, less red.     Overall recovering well, meeting all milestones.     PMHx, PSHx, SHx, Allergies, and Medications updated in Epic.    ROS: reviewed and negative    PE: BP (!) 133/96   Pulse 89   Wt (!) 156 kg (344 lb)   BMI 47.98 kg/m²    Constitutional:  No acute distress  HEENT: EOM grossly intact, MMM, neck supple and with full ROM  Pulm:  Effort normal. No accessory muscle usage.  No respiratory distress.  Abd: soft, non-distended, non-tender, no palpable masses, incisions c/d/I, suprapubic incision w/ mild erythema at skin edges, superficial skin w/ <0.5mm of separation, no purulence   Neurological:  AAO x 3, appropriate to interview  Skin: Warm, no pallor.  Psychiatric:  normal mood and affect.    Assessment/Plan:     Tracie Malagon is a 34 y.o. status post TLH-BS on 6/7/2024 presents for post op incision check.   - Superficial incisional infection improving w/ Keflex, dilute hydrogen peroxide rinse   - Path reviewed, benign  - Continue post op restrictions until 6 weeks after surgery, reviewed these today    Anna Srivastava MD  Division of Minimally Invasive Gynecologic Surgery  [facility]

## 2024-07-08 ASSESSMENT — ENCOUNTER SYMPTOMS
CHILLS: 0
DIZZINESS: 0
FEVER: 0
SHORTNESS OF BREATH: 0
HEADACHES: 0

## 2024-07-08 NOTE — PROGRESS NOTES
Patient is seen at the request of SELF for my opinion regarding abnormal thyroid function tests. My final recommendations will be communicated back to the requesting provider by way of shared medical record.    Subjective   Tracie Malagon is a 34 y.o. female here for evaluation of abnormal thyroid function tests.    TSH mildly elevated with normal FT4.  Here today to discuss abnormal thyroid function tests.    Today:  -fatigue, insomnia, does not feel better even after sleeping  -hair and nail are brittle  -dry skin, brain fog  -lost about 130 lbs on her own with dietary and exercise but has been regaining the weight (had surgery for thoracic outlet syndrome and hysterectomy the past few months and has decreased intake to match her output but regardless of this she has not been gaining weight again)    Family Hx: mother with hypothyroidism, father passed at 43 yo from a sudden cardiac event    Review of Systems   Constitutional:  Negative for chills and fever.   Respiratory:  Negative for shortness of breath.    Cardiovascular:  Negative for chest pain.   Neurological:  Negative for dizziness and headaches.   Psychiatric/Behavioral:  Negative for behavioral problems.        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 07/05/2022    now resolved s/p thoracic decompression    C4 cervical fracture (Multi)     LANDEN II (cervical intraepithelial neoplasia II) 2021    Dyscalculia     Endometriosis     Fibrocystic breast changes     Fibroid     GERD (gastroesophageal reflux disease)     Intramural leiomyoma of uterus     Migraines     Neurogenic thoracic outlet syndrome of right brachial plexus        Past Surgical History:   Procedure Laterality Date    ADENOIDECTOMY  1996    CERVICAL BIOPSY  W/ LOOP ELECTRODE EXCISION  2021    CONDYLOMA EXCISION/FULGURATION      DILATION AND CURETTAGE OF UTERUS  2021     HYSTEROSCOPY W/ POLYPECTOMY      LAPAROSCOPY DIAGNOSTIC / BIOPSY / ASPIRATION / LYSIS      for benign jose daniel-hepatic tumor    OTHER SURGICAL HISTORY      anal lesion excision    THORACIC OUTLET SURGERY  2022    TONSILLECTOMY         Social History     Socioeconomic History    Marital status: Single     Spouse name: Not on file    Number of children: Not on file    Years of education: Not on file    Highest education level: Not on file   Occupational History    Not on file   Tobacco Use    Smoking status: Former     Current packs/day: 0.00     Average packs/day: 0.3 packs/day for 2.0 years (0.5 ttl pk-yrs)     Types: Cigarettes     Start date: 2022     Quit date: 2024     Years since quittin.4    Smokeless tobacco: Never    Tobacco comments:     Quit for 10 years then started again. Quitting again   Vaping Use    Vaping status: Some Days    Substances: Nicotine, THC, Flavoring    Devices: Disposable   Substance and Sexual Activity    Alcohol use: Not Currently     Comment: socially-rarely    Drug use: Yes     Frequency: 1.0 times per week     Types: Marijuana     Comment: Only a few days a month on bad cramp days: gummies    Sexual activity: Yes     Partners: Female, Male     Birth control/protection: None   Other Topics Concern    Not on file   Social History Narrative    Not on file     Social Determinants of Health     Financial Resource Strain: Low Risk  (2021)    Received from [facility]    Overall Financial Resource Strain (CARDIA)     Difficulty of Paying Living Expenses: Not hard at all   Food Insecurity: No Food Insecurity (2021)    Received from [facility]    Hunger Vital Sign     Worried About Running Out of Food in the Last Year: Never true     Ran Out of Food in the Last Year: Never true   Transportation Needs: Not on file   Physical Activity: Not on file   Stress: Not on file   Social Connections: Not on file  "  Intimate Partner Violence: Not on file   Housing Stability: Not on file       Objective    Blood pressure 128/90, pulse 98, height 1.803 m (5' 11\"), weight (!) 154 kg (340 lb), not currently breastfeeding.  Physical Exam  Constitutional:       General: She is not in acute distress.     Appearance: Normal appearance.   Eyes:      Extraocular Movements: Extraocular movements intact.   Neck:      Thyroid: No thyromegaly.      Comments: No palpable nodules  Musculoskeletal:      Right lower leg: No edema.      Left lower leg: No edema.   Neurological:      Mental Status: She is alert and oriented to person, place, and time.       Current Outpatient Medications:     lidocaine (Lidoderm) 5 % patch, Place 1 patch over 12 hours on the skin once daily. Remove & discard patch within 12 hours or as directed by MD., Disp: 30 patch, Rfl: 1    multivitamin tablet, Take 2 tablets by mouth once daily., Disp: , Rfl:     ondansetron ODT (Zofran-ODT) 4 mg disintegrating tablet, Take 1 tablet (4 mg) by mouth every 4 hours if needed for nausea or vomiting., Disp: 10 tablet, Rfl: 0    oxyCODONE (Roxicodone) 5 mg immediate release tablet, Take 1 tablet (5 mg) by mouth every 6 hours if needed for moderate pain (4 - 6) or severe pain (7 - 10)., Disp: 10 tablet, Rfl: 0    oxyCODONE (Roxicodone) 5 mg immediate release tablet, Take 1 tablet (5 mg) by mouth every 6 hours if needed for severe pain (7 - 10). (Patient not taking: Reported on 7/9/2024), Disp: 10 tablet, Rfl: 0    Component      Latest Ref Rng 2/13/2024 5/20/2024   Thyroid Stimulating Hormone      0.44 - 3.98 mIU/L 4.97 (H)  5.76 (H)    Thyroxine, Free      0.78 - 1.48 ng/dL 0.82  1.28        Assessment/Plan   Tracie Malagon is a 34 y.o. female who presents for evaluation of subclinical hypothyroidism    Subclinical hypothyroidism  TSH mildly elevated since 02/2024 with normal FT4.  Previously normal TSH.    Most recent labs from 5/20/2024  TSH 5.76  FT4 1.28    Discussed " subclinical hypothyroidism.  Reports fatigue, weight gain, dry skin, brittle nails/hair, and constipation.  Discussed that mild subclinical hypothyroidism most often does not cause symptoms but she does have many symptoms that may be due to hypothyroidism.  In asymptomatic individuals, subclinical hypothyroidism does not require treatment unless TSH>8-10. If symptomatic, can start T4 replacement even if TSH <10.  She would like to start T4 to see if she has any symptom improvement. Can consider stopping LT4 and monitoring TFTs if T4 replacement does not help with symptoms.  Discussed that it will likely take several weeks for symptom improvement and changes will be gradual.    PLAN:  -start levothyroxine 50 mcg/day  -check TSH in 2 months    Follow up in 6 months  Uses [facility].

## 2024-07-09 ENCOUNTER — APPOINTMENT (OUTPATIENT)
Dept: ENDOCRINOLOGY | Facility: CLINIC | Age: 34
End: 2024-07-09
Payer: COMMERCIAL

## 2024-07-09 VITALS
WEIGHT: 293 LBS | HEIGHT: 71 IN | SYSTOLIC BLOOD PRESSURE: 128 MMHG | DIASTOLIC BLOOD PRESSURE: 90 MMHG | HEART RATE: 98 BPM | BODY MASS INDEX: 41.02 KG/M2

## 2024-07-09 DIAGNOSIS — E03.8 SUBCLINICAL HYPOTHYROIDISM: Primary | ICD-10-CM

## 2024-07-09 PROCEDURE — 3008F BODY MASS INDEX DOCD: CPT | Performed by: STUDENT IN AN ORGANIZED HEALTH CARE EDUCATION/TRAINING PROGRAM

## 2024-07-09 PROCEDURE — 99205 OFFICE O/P NEW HI 60 MIN: CPT | Performed by: STUDENT IN AN ORGANIZED HEALTH CARE EDUCATION/TRAINING PROGRAM

## 2024-07-09 RX ORDER — LEVOTHYROXINE SODIUM 50 UG/1
TABLET ORAL
Qty: 30 TABLET | Refills: 5 | Status: SHIPPED | OUTPATIENT
Start: 2024-07-09

## 2024-07-11 ENCOUNTER — APPOINTMENT (OUTPATIENT)
Dept: OBSTETRICS AND GYNECOLOGY | Facility: CLINIC | Age: 34
End: 2024-07-11
Payer: COMMERCIAL

## 2024-07-12 DIAGNOSIS — B37.31 YEAST VAGINITIS: Primary | ICD-10-CM

## 2024-07-12 DIAGNOSIS — T81.49XA SUPERFICIAL POSTOPERATIVE WOUND INFECTION: Primary | ICD-10-CM

## 2024-07-12 RX ORDER — CEPHALEXIN 500 MG/1
500 CAPSULE ORAL 4 TIMES DAILY
Qty: 28 CAPSULE | Refills: 0 | Status: SHIPPED | OUTPATIENT
Start: 2024-07-12 | End: 2024-07-19

## 2024-07-12 RX ORDER — FLUCONAZOLE 150 MG/1
150 TABLET ORAL SEE ADMIN INSTRUCTIONS
Qty: 2 TABLET | Refills: 0 | Status: SHIPPED | OUTPATIENT
Start: 2024-07-12 | End: 2024-07-13

## 2024-07-17 ENCOUNTER — APPOINTMENT (OUTPATIENT)
Dept: OBSTETRICS AND GYNECOLOGY | Facility: CLINIC | Age: 34
End: 2024-07-17
Payer: COMMERCIAL

## 2024-07-17 VITALS — DIASTOLIC BLOOD PRESSURE: 78 MMHG | WEIGHT: 293 LBS | BODY MASS INDEX: 48.26 KG/M2 | SYSTOLIC BLOOD PRESSURE: 120 MMHG

## 2024-07-17 DIAGNOSIS — M62.89 PELVIC FLOOR DYSFUNCTION: ICD-10-CM

## 2024-07-17 DIAGNOSIS — Z48.89 POSTOPERATIVE VISIT: Primary | ICD-10-CM

## 2024-07-17 PROCEDURE — 99024 POSTOP FOLLOW-UP VISIT: CPT | Performed by: STUDENT IN AN ORGANIZED HEALTH CARE EDUCATION/TRAINING PROGRAM

## 2024-07-17 ASSESSMENT — ENCOUNTER SYMPTOMS
CARDIOVASCULAR NEGATIVE: 0
GASTROINTESTINAL NEGATIVE: 0
ENDOCRINE NEGATIVE: 0
EYES NEGATIVE: 0
PSYCHIATRIC NEGATIVE: 0
CONSTITUTIONAL NEGATIVE: 0
NEUROLOGICAL NEGATIVE: 0
MUSCULOSKELETAL NEGATIVE: 0
RESPIRATORY NEGATIVE: 0
ALLERGIC/IMMUNOLOGIC NEGATIVE: 0
HEMATOLOGIC/LYMPHATIC NEGATIVE: 0

## 2024-07-17 ASSESSMENT — PATIENT HEALTH QUESTIONNAIRE - PHQ9
2. FEELING DOWN, DEPRESSED OR HOPELESS: NOT AT ALL
SUM OF ALL RESPONSES TO PHQ9 QUESTIONS 1 AND 2: 0
1. LITTLE INTEREST OR PLEASURE IN DOING THINGS: NOT AT ALL

## 2024-07-17 NOTE — PROGRESS NOTES
Division of Minimally Invasive Gynecologic Surgery  [facility]    07/16/24 Gynecology Visit    Tracie Malagon is a 34 y.o. status post TLH-BS on 6/7/2024 presents for post op incision check. Reported redness, purulent discharge, and warmth at umbilical incision. Started on dilute hydrogen peroxide rinses and Keflex recently.     Redness, warmth, and purulent discharge have resolved. She does still have some intermittent serous drainage.     Otherwise recovering well, meeting all milestones.     PMHx, PSHx, SHx, Allergies, and Medications updated in Epic.    ROS: reviewed and negative    PE: /78   Wt (!) 157 kg (346 lb)   BMI 48.26 kg/m²    Constitutional:  No acute distress  HEENT: EOM grossly intact, MMM, neck supple and with full ROM  Pulm:  Effort normal. No accessory muscle usage.  No respiratory distress.  Abd: soft, non-distended, non-tender, no palpable masses, incisions c/d/I  Neurological:  AAO x 3, appropriate to interview  Skin: Warm, no pallor.  Psychiatric:  normal mood and affect.    Assessment/Plan:     Tracie Malagon is a 34 y.o. status post TLH-BS on 6/7/2024 presents for post op incision check.   - Superficial incisional infection improving w/ Keflex, dilute hydrogen peroxide rinse   - Path reviewed, benign  - Cleared from a post op perspective   - Given history of HSIL/LANDEN 2 in 2021, she will still require future pap smears     Anna Srivastava MD  Division of Minimally Invasive Gynecologic Surgery  [facility]

## 2024-07-24 ENCOUNTER — APPOINTMENT (OUTPATIENT)
Dept: OBSTETRICS AND GYNECOLOGY | Facility: CLINIC | Age: 34
End: 2024-07-24
Payer: COMMERCIAL

## 2024-08-26 ENCOUNTER — APPOINTMENT (OUTPATIENT)
Dept: GENETICS | Facility: CLINIC | Age: 34
End: 2024-08-26
Payer: COMMERCIAL

## 2024-08-26 DIAGNOSIS — Z13.71 ENCOUNTER FOR NONPROCREATIVE GENETIC COUNSELING AND TESTING: Primary | ICD-10-CM

## 2024-08-26 DIAGNOSIS — Z80.3 FAMILY HISTORY OF BREAST CANCER: ICD-10-CM

## 2024-08-26 DIAGNOSIS — Z71.83 ENCOUNTER FOR NONPROCREATIVE GENETIC COUNSELING AND TESTING: Primary | ICD-10-CM

## 2024-08-26 PROCEDURE — GENMD PR GENETICS VISIT (MEDICAID/MEDICARE): Performed by: GENETIC COUNSELOR, MS

## 2024-08-26 NOTE — PROGRESS NOTES
History of Present Illness:  Tracie Malagon is a 34 y.o. female with a family history of breast cancer.  She was referred to the Cancer Genetics Clinic at [facility] by Mary Sampson APRN-C*. Ms. Malagon is interested in genetic testing to clarify their personal risk for cancer, as well as the risks to their family members.    Cancer Medical History:  Personal history of cancer? No.    Prior hereditary cancer (germline) genetic testing? No.    Cancer screening history:  Mammograms? Yes, x1 about 12-13 years ago. None since.  Breast MRI? No.  Breast biopsy? No.  PAP smear? 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 Recommended to continue with Paps because of history of high grade dysplasia.  Colonoscopy? No.   Upper endoscopy? No.  Dermatology? No.  Other cancer screening? No.    Reproductive History:  Number of pregnancies: 0.  Menarche (age): 12.  Menopause (age): N/A.  OCP: Yes, for ~2 years.  HRT: N/A.    Hysterectomy? Yes, TLH-BS on 2024 for fibroids, adenomyosis, and CIN2-3  Oophorectomy? No. Ovaries intact.    Family history:  A 4-generation pedigree was obtained and was significant for the following:    -Patient, no history of cancer;  -Mother, skin cancer, alive at 57;  -Maternal aunt, breast cancer in her mid 40s, alive at 60;  -Maternal grandfather, bladder cancer, alive at 81;  -Maternal 1st cousins once removed x3 (through [facility] [place] breast cancer (two living, one also had lung cancer and is now );  -Maternal 1st cousin once removed x1 (through [facility] [place]), liver cancer;  -Maternal grandmother, breast cancer in her late 30s,  at 64;  -Maternal great uncle (M's brother), breast cancer in his 50s, ;  -Father, no history of cancer,  of sudden cardiac arrest at 44;  -Paternal grandmother, breast cancer at unknown age,  in her late 70s.    Ancestry is  based on ancestry testing. There is no known Ashkenazi Rastafarian ancestry or consanguinity.    Genetic counseling:  Ms. Malagon  is a 34 y.o. female with a maternal family history of breast cancer concerning for hereditary breast cancer. None of her relatives had had any genetic testing for hereditary cancer of which she is aware. The family history could be due to BRCA1 or BRCA2-related hereditary breast and ovarian cancer (HBOC), or hereditary breast cancer due to a different gene mutation, such as PALB2.  Ms. Malagon meets current national (NCCN) criteria for testing of high-penetrance breast cancer susceptibility genes, including BRCA1, BRCA2, CDH1, PALB2, PTEN, and TP53.  Testing is medically necessary, as it will help determine if Ms. Malagon is a candidate for high-risk breast care as well as risk-reducing bilateral oophorectomies (having the ovaries removed to prevent ovarian cancer).    We reviewed genes and chromosomes, inherited forms of breast and ovarian cancer, and the BRCA1 and BRCA2 genes causing HBOC.  We discussed that most cancers are not due to an inherited genetic susceptibility.  However, in about 5-10% of families, there is an inherited genetic mutation that can make a person more susceptible to developing certain forms of cancer.  Within these families, we often see multiple family members with cancer, occurring in multiple generations.  In addition, earlier onset and bilateral cancers are suggestive of an inherited form of cancer.  Finally, there is a clustering of certain types of cancer in these families, such as breast and ovarian cancer..    We discussed the BRCA1 and BRCA2 genes, which are two genes that have been linked to early-onset breast and/or ovarian cancer.  Mutations in these genes are inherited in a dominant pattern and confer up to an 87% lifetime risk for breast cancer.  This is elevated compared to the general population risk of 10-12%.  In addition, BRCA1 and BRCA2 mutation carriers have up to a 45% lifetime risk for ovarian cancer, which is elevated over the 2% general population risk.  Mutation  carriers who have already been diagnosed with cancer have an increased risk to develop a second, contralateral breast cancer.  BRCA2 gene mutation carriers have an increased risk for male breast cancer, prostate cancer, melanoma, gastric cancer, and pancreatic cancer.    We discussed that there are multiple genes associated with increased breast and/or ovarian cancer risk. Some genes, like the BRCA genes are considered highly penetrant breast and ovarian cancer genes, meaning a mutation in the gene confers a high risk of breast and/or ovarian cancer. On the other hand, there are other intermediate (moderate risk) breast and ovarian cancer genes. For many of the moderate risk genes, there is sometimes limited information regarding the degree to which a mutation in the gene affects risk of different types of cancers. Additionally, for some of these moderate risk genes, the appropriate management for individuals who have a mutation in one of these genes is not always clear. In many cases, even if an individual tests positive for a mutation in a moderate risk gene, recommendations are still based on the family history, not the positive test result.    Ms. Malagon was counseled about hereditary cancer susceptibility including cancer risks, options for increased screening and/or risk reduction, genetic testing, and the implications for other family members.  We discussed performing testing for high-penetrance breast cancer susceptibility genes, ideally as part of a multi-gene panel.  We discussed both smaller and larger panel options, and Ms. Malagon expressed the greatest interest in the [facility] CancerNext-Expanded panel, which examines the following 71 genes:  AIP, ALK, APC, NADEEM, AXIN2, BAP1, BARD1, BMPR1A, BRCA1, BRCA2, BRIP1, CDC73, CDH1, CDK4, CDKN1B, CDKN2A, CHEK2, CTNNA1, DICER1, EGFR, EGLN1, EPCAM, FH, FLCN, GREM1, HOXB13, KIF1B, KIT, LZTR1, MAX, MEN1, MET, MITF, MLH1, MSH2, MSH3, MSH6, MUTYH, NF1, NF2, NTHL1, PALB2,  PDGFRA, PHOX2B, PMS2, POLD1, POLE, POT1, WSUHA2I, PTCH1, PTEN, RAD51C, RAD51D, RB1, RET, SDHA, SDHAF2, SDHB, SDHC, SDHD, SMAD4, SMARCA4, SMARCB1, SMARCE1, STK11, SUFU, HHAW232, TP53, TSC1, TSC2, VHL.    We discussed the Genetic Information Nondiscrimination Act (JORDEN). We discussed the protections and limitations of JORDEN. JORDEN generally makes in illegal for health insurance companies, group health plans, and most employers (with >15 employees) to discriminate based on genetic information. It does not protect against genetic discrimination for life insurance, disability insurance, long-term care, or other insurances.    After a discussion about the risks, benefits, and limitations of genetic testing,  Ms. Malagon elected to undergo genetic testing for hereditary cancer using the [facility] panel described above. Oral consent for testing was obtained. An AlertMe DNA/RNA blood test kit will be sent to Ms. Malagon's home. They will then bring the test kit to a  outpatient blood draw lab to have their blood drawn for testing (using the test tubes provided in the kit). The sample will then be sent to [facility] for analysis.    Results are typically available in 3 weeks from the time of blood draw.  Ms. Malagon will return to the Cancer Genetics Clinic to discuss their testing results.  At that time, we will make recommendations for both Ms. Malagon and their family members in terms of cancer screening and/or cancer risk reduction options.    PLAN:  1. Ms. Malagon elected to undergo genetic testing for hereditary cancer using the [facility] panel described above.  Oral consent for testing was obtained. An AlertMe DNA/RNA blood test kit will be sent to Ms. Malagon's home. They will then bring the test kit to a  outpatient blood draw lab to have their blood drawn for testing (using the test tubes provided in the kit). The sample will then be sent to [facility] for analysis.    2.  Results are typically available in 3 weeks from the  time of blood draw. Ms. Malagon will return to the Cancer Genetics Clinic to discuss their test results.  A follow up appointment has been scheduled for Wednesday 9/25 at 8:30 am, via virtual visit.    3. We remain available to Ms. Malagon at [phone] if any questions arise regarding information discussed at today's visit. Briseyda can be reached directly at [phone].    Briseyda Russ MS, [facility] [place]  Genetic Counselor  [facility]  [phone]    Reviewed by:  Kaia Ny MD  Clinical   [facility]  [phone]    Time spent with patient:  38 minutes (3-3:38 pm), virtual visit.    Virtual or Telephone Consent    An interactive audio and video telecommunication system which permits real time communications between the patient (at the originating site) and provider (at the distant site) was utilized to provide this telehealth service.   Verbal consent was requested and obtained from Tracie Malagon on this date, 08/26/24 for a telehealth visit.

## 2024-08-29 ENCOUNTER — LAB (OUTPATIENT)
Dept: LAB | Facility: LAB | Age: 34
End: 2024-08-29
Payer: COMMERCIAL

## 2024-08-29 DIAGNOSIS — E03.8 SUBCLINICAL HYPOTHYROIDISM: ICD-10-CM

## 2024-08-29 DIAGNOSIS — Z80.3 FAMILY HISTORY OF BREAST CANCER: ICD-10-CM

## 2024-08-29 LAB — TSH SERPL-ACNC: 1.7 MIU/L (ref 0.44–3.98)

## 2024-08-29 PROCEDURE — 84443 ASSAY THYROID STIM HORMONE: CPT

## 2024-08-29 PROCEDURE — 36415 COLL VENOUS BLD VENIPUNCTURE: CPT

## 2024-08-29 PROCEDURE — 9990000009 MISCELLANEOUS GENETICS TEST

## 2024-09-05 ENCOUNTER — APPOINTMENT (OUTPATIENT)
Dept: ENDOCRINOLOGY | Facility: CLINIC | Age: 34
End: 2024-09-05
Payer: COMMERCIAL

## 2024-09-09 ENCOUNTER — EVALUATION (OUTPATIENT)
Dept: PHYSICAL THERAPY | Facility: CLINIC | Age: 34
End: 2024-09-09
Payer: COMMERCIAL

## 2024-09-09 DIAGNOSIS — M62.89 PELVIC FLOOR DYSFUNCTION: Primary | ICD-10-CM

## 2024-09-09 PROCEDURE — 97535 SELF CARE MNGMENT TRAINING: CPT | Mod: GP

## 2024-09-09 PROCEDURE — 97110 THERAPEUTIC EXERCISES: CPT | Mod: GP

## 2024-09-09 PROCEDURE — 97161 PT EVAL LOW COMPLEX 20 MIN: CPT | Mod: GP

## 2024-09-09 ASSESSMENT — PAIN - FUNCTIONAL ASSESSMENT: PAIN_FUNCTIONAL_ASSESSMENT: 0-10

## 2024-09-09 ASSESSMENT — PATIENT HEALTH QUESTIONNAIRE - PHQ9
1. LITTLE INTEREST OR PLEASURE IN DOING THINGS: NOT AT ALL
2. FEELING DOWN, DEPRESSED OR HOPELESS: NOT AT ALL
SUM OF ALL RESPONSES TO PHQ9 QUESTIONS 1 AND 2: 0

## 2024-09-09 ASSESSMENT — ENCOUNTER SYMPTOMS
DEPRESSION: 0
LOSS OF SENSATION IN FEET: 0
OCCASIONAL FEELINGS OF UNSTEADINESS: 0

## 2024-09-09 ASSESSMENT — PAIN SCALES - GENERAL: PAINLEVEL_OUTOF10: 4

## 2024-09-09 NOTE — PROGRESS NOTES
Physical Therapy    EVALUATION AND TREATMENT    Name: Tracie Malagon  MRN: [id number]  : 1990  Today's Date: 24     Time Calculation  Start Time: 840  Stop Time: 934  Time Calculation (min): 54 min    Assessment:    Pt presenting to the clinic with substantial pelvic floor history. Most recently, she is s/p a hysterectomy 24 due to adenomyosis but has had one previous excision for suspected endometriosis. Her pain is significantly improved post operatively. However, given chronicity of symptoms and surgical induced weakness, pt is at higher risk for developing central sensitization and/or worsening pelvic pain. Anticipate pt has hypertonicity of PF given her subjective history but will confirm with a vaginal exam.     Insurance:  Visit number: 1  Insurance Type: Marvin  Approved # of visits: 30  Authorization Needed: yes  Cert Date Ends On: 24    Plan:   PT Plan: Skilled PT  PT Frequency: 1 time per week  Duration: 4-6 months  Rehab Potential: Excellent  Plan of Care Agreement: Patient  Planned interventions include: biofeedback, cryotherapy, education/instruction, electrical stimulation, gait training, home program, hot pack, kinesiotaping, manual therapy, neuromuscular re-education, self care/home management, therapeutic activities, and therapeutic exercises.   Access Code: [email or web address]  URL: [email or web address]  Date: 2024  Prepared by: Digna Bergman    Exercises  - Clamshell  - 1 x daily - 7 x weekly - 2 sets - 10 reps - 5 seconds  hold  - Sidelying Hip Abduction  - 1 x daily - 7 x weekly - 2 sets - 10 reps - 5 seconds  hold  - Supine Bridge  - 1 x daily - 7 x weekly - 2 sets - 10 reps - 5 seconds  hold  - Hooklying Transversus Abdominis Palpation  - 1 x daily - 7 x weekly - 2 sets - 10 reps - 5 seconds  hold  - Instruction given to patient re: PF pathophysiology, POC  Current Problem:  1. Pelvic floor dysfunction  Referral to Physical Therapy    Follow Up  In Physical Therapy          Subjective   General:  Reason for Referral: Pelvic pain s/p hysterectomy 6-7-24  Referred By: Dr. Srivastava  Ovary sparing hysterectomy 6-7-24. She had an endometriosis scope in 2022, however no endometriosis with last hysterectomy. Hx of thoracic decompression s/p removal floating rib, thoracic decompression. Periods were bad in high school that got progressively worse.   Pain has been significantly improved from 8/10 to 4/10 since surgery. She tried intercourse and felt a lot of pain. She could not continue, there was pain with insertion and deeper penetration. Intermittent pain with menstrual cup removal/insertion.     Urination:  Initial post op incontinence which is improving. She does not leak now.   No need for urinary liner.  Bladder spasm post void, incomplete emptying. No issues with starting her stream.  Daytime void: every hour since being put on thyroid medication  Night time void: 0     Work from home     No pregnancies, no deliveries. She is not interested in kids in the future.   Precautions:  Precautions Comment: none     PMH: adenomyosis, migraines  Fall Risk: no   Pain:  Pain Assessment: 0-10  0-10 (Numeric) Pain Score: 4    Objective   Rectal and vaginal exam deferred due to extensive interview but will be performed at next visit- pt aware    Ortho Screen:  AROM:  Decreased L thoracic rotation   Otherwise trunk and spine WFL in all planes     PROM/Joint Mobility:  Restricted HS length R > L   Restricted hip mobility ER limited more than IR     Strength:  Grossly deconditioned   Able to contract TA but with increased oblique     Special Tests:  - SI cluster  - SLR     Palpation:  Minimal scar tissue build up R portal incision  Mild shelving over inferior incision   Observation:   Increased genu varus   SL stance:  Poor frontal plane control, R and L LE     Outcome Measure:   NIH CPSI pain 13 NIH CPSI urination 6 NIH CPSI quailty of life 7    Careplan Goals:  1. Pt will  be independent in HEP to maximize PT POC   2. Pt will be able to improve worst pain severity on NPRS by >2 points MCID   3. Pt will improve NIH CPSI by >50% raw score    Sweetie Bergman, PT

## 2024-09-16 ENCOUNTER — TREATMENT (OUTPATIENT)
Dept: PHYSICAL THERAPY | Facility: CLINIC | Age: 34
End: 2024-09-16
Payer: COMMERCIAL

## 2024-09-16 DIAGNOSIS — M62.89 PELVIC FLOOR DYSFUNCTION: ICD-10-CM

## 2024-09-16 LAB
COMMENTS - MP RESULT TYPE: NORMAL
SCAN RESULT: NORMAL

## 2024-09-16 PROCEDURE — 97110 THERAPEUTIC EXERCISES: CPT | Mod: GP

## 2024-09-16 PROCEDURE — 97535 SELF CARE MNGMENT TRAINING: CPT | Mod: GP

## 2024-09-16 PROCEDURE — 97140 MANUAL THERAPY 1/> REGIONS: CPT | Mod: GP

## 2024-09-16 NOTE — PROGRESS NOTES
"PHYSICAL THERAPY   TREATMENT NOTE    Patient Name:  Tracie Malagon   Patient MRN: [id number]  Date: 09/23/24    Time Calculation  Start Time: 0835  Stop Time: 0920  Time Calculation (min): 45 min    Insurance:  Visit number: 2  Insurance Type: Caresource  Approved # of visits: 30  Authorization Needed: yes  Cert Date Ends On: 12-31-24    General   Reason for visit: post op weakness  Referred by: Dr. Srivastava     Therapy diagnoses:   1. Pelvic floor dysfunction  Follow Up In Physical Therapy           Assessment:    Pt with multiple vaginal trigger points L > R   Pain levels were unchanged at the end of the treatment.    Plan:  1) Email update  2) Look into OMGtyes and other sexual exploration sites   3) May look into vaginal wand   4) Continuegym program and increase hip abduction     Subjective  Pt pain levels are overall reducing, minor fear component regarding increase in activity. She feels like she has never been able to orgasm or have intercourse pain free.   Pain (0-10): 0  Pt primary goal: optimize health, \"acquire a new normal\", improve intercourse     Precautions  PMH: adenomyosis, migraines  Fall Risk: no     Objective    Treatment Performed:   Therapeutic Exercise:    minutes  Access Code: [email or web address]  URL: [email or web address]  Date: 09/16/2024  Prepared by: Digna Bergman    Exercises  - Sidelying Reverse Clamshell  - 1 x daily - 7 x weekly - 2 sets - 10 reps - 5 seconds  hold  - Beginner Front Arm Support  - 1 x daily - 7 x weekly - 2 sets - 10 reps - 5 seconds  hold  Self Care:   20 minutes  Discussed intercourse and current presenting problem at length   Manual Therapy:   25 minutes  External and internal assessment explained. Verbal consent obtained to proceed with vaginal or rectal exam and consented for the treatment approaches today. Patient understands they have power and right to stop examination at any time.     External Vaginal Observation:  Voluntary Contraction: " +  Voluntary Relaxation: +  Involuntary Contraction: +   Involuntary Relaxation: +    Vaginal palpation external: no trigger points, - Q tip test   1 o'clock (ischiocavernosus)  2 o'clock (bulbocavernosus)  3 o'clock (superficial transverse perineal)  4 o'clock (pubococcygeus)  5 o'clock (iliococcygeus)  6 o'clock (coccyx)  7 o'clock (iliococcygeus)  8 o'clock (pubococcygeus)  9 o'clock (superficial transverse perineal)  10 o'clock (bulbocavernosus)  11 o'clock (ischiocavernosus)  12 o'clock (pubic symphysis inferior angle)  Obturator:  Piriformis:    Vaginal palpation internal:  1 o'clock (ischiocavernosus)  2 o'clock (bulbocavernosus)  3 o'clock (superficial transverse perineal)  4 o'clock (pubococcygeus)  5 o'clock (iliococcygeus)  6 o'clock (coccyx)  7 o'clock (iliococcygeus)  8 o'clock (pubococcygeus)  9 o'clock (superficial transverse perineal)  10 o'clock (bulbocavernosus)  11 o'clock (ischiocavernosus)  12 o'clock (pubic symphysis inferior angle)  Obturator: L  Piriformis: L    Pelvic Floor MMT Grade  0/zero: no palpable contraction/squeeze  1/trace: flicker of squeeze or contraction  2/poor: squeeze pressure asymmetrical or felt at various points- no lift or displacement  3/fair: squeeze pressure (contraction) and lift or displacement  4/good: squeeze pressure (contraction) and lift or displacement from anterior, posterior, and side walls  5/strong: full circumference of finger compressed, displaced with an inward pull    [place]cock PERF(Power/Endurance/Repetitions/Fast Twitch)  3/3/2/7    Neuromuscular Re-education:   minutes    Gait Training:    minutes    Modalities: minutes

## 2024-09-25 ENCOUNTER — APPOINTMENT (OUTPATIENT)
Dept: GENETICS | Facility: CLINIC | Age: 34
End: 2024-09-25
Payer: COMMERCIAL

## 2024-09-25 DIAGNOSIS — Z71.83 ENCOUNTER FOR NONPROCREATIVE GENETIC COUNSELING AND TESTING: ICD-10-CM

## 2024-09-25 DIAGNOSIS — Z80.3 FAMILY HISTORY OF BREAST CANCER: ICD-10-CM

## 2024-09-25 DIAGNOSIS — Z13.71 BRCA NEGATIVE: ICD-10-CM

## 2024-09-25 DIAGNOSIS — Z13.71 ENCOUNTER FOR NONPROCREATIVE GENETIC COUNSELING AND TESTING: ICD-10-CM

## 2024-10-01 ENCOUNTER — APPOINTMENT (OUTPATIENT)
Dept: PHYSICAL THERAPY | Facility: CLINIC | Age: 34
End: 2024-10-01
Payer: COMMERCIAL

## 2024-10-03 DIAGNOSIS — E03.8 SUBCLINICAL HYPOTHYROIDISM: Primary | ICD-10-CM

## 2024-10-04 ENCOUNTER — OFFICE VISIT (OUTPATIENT)
Dept: PRIMARY CARE | Facility: CLINIC | Age: 34
End: 2024-10-04
Payer: COMMERCIAL

## 2024-10-04 VITALS
TEMPERATURE: 96.8 F | WEIGHT: 293 LBS | SYSTOLIC BLOOD PRESSURE: 125 MMHG | DIASTOLIC BLOOD PRESSURE: 87 MMHG | OXYGEN SATURATION: 97 % | BODY MASS INDEX: 49.29 KG/M2 | HEART RATE: 87 BPM

## 2024-10-04 DIAGNOSIS — J01.00 ACUTE NON-RECURRENT MAXILLARY SINUSITIS: Primary | ICD-10-CM

## 2024-10-04 PROBLEM — R00.0 TACHYCARDIA: Status: RESOLVED | Noted: 2021-08-19 | Resolved: 2024-10-04

## 2024-10-04 PROBLEM — N94.6 DYSMENORRHEA: Status: RESOLVED | Noted: 2024-02-13 | Resolved: 2024-10-04

## 2024-10-04 PROBLEM — N80.9 ENDOMETRIOSIS: Status: RESOLVED | Noted: 2021-08-19 | Resolved: 2024-10-04

## 2024-10-04 PROBLEM — M54.2 NECK PAIN ON RIGHT SIDE: Status: RESOLVED | Noted: 2021-10-14 | Resolved: 2024-10-04

## 2024-10-04 PROBLEM — N60.19 FIBROCYSTIC BREAST CHANGES: Status: RESOLVED | Noted: 2021-08-19 | Resolved: 2024-10-04

## 2024-10-04 PROBLEM — R63.5 WEIGHT GAIN: Status: RESOLVED | Noted: 2024-02-13 | Resolved: 2024-10-04

## 2024-10-04 PROBLEM — E03.8 SUBCLINICAL HYPOTHYROIDISM: Status: ACTIVE | Noted: 2024-10-04

## 2024-10-04 PROBLEM — R03.0 ELEVATED BLOOD PRESSURE READING: Status: RESOLVED | Noted: 2024-02-13 | Resolved: 2024-10-04

## 2024-10-04 PROBLEM — R10.2 PELVIC PAIN: Status: RESOLVED | Noted: 2024-02-13 | Resolved: 2024-10-04

## 2024-10-04 PROBLEM — D25.1 INTRAMURAL LEIOMYOMA OF UTERUS: Status: RESOLVED | Noted: 2024-03-21 | Resolved: 2024-10-04

## 2024-10-04 PROBLEM — Z01.411 ENCOUNTER FOR WELL WOMAN EXAM WITH ABNORMAL FINDINGS: Status: RESOLVED | Noted: 2024-03-21 | Resolved: 2024-10-04

## 2024-10-04 PROBLEM — G43.009 MIGRAINE WITHOUT AURA AND WITHOUT STATUS MIGRAINOSUS, NOT INTRACTABLE: Status: RESOLVED | Noted: 2021-10-13 | Resolved: 2024-10-04

## 2024-10-04 PROBLEM — J30.89 ENVIRONMENTAL AND SEASONAL ALLERGIES: Status: ACTIVE | Noted: 2024-10-04

## 2024-10-04 PROBLEM — N92.0 MENORRHAGIA WITH REGULAR CYCLE: Status: RESOLVED | Noted: 2024-02-13 | Resolved: 2024-10-04

## 2024-10-04 PROBLEM — R87.610 ASCUS OF CERVIX WITH NEGATIVE HIGH RISK HPV: Status: RESOLVED | Noted: 2024-04-08 | Resolved: 2024-10-04

## 2024-10-04 PROCEDURE — 99213 OFFICE O/P EST LOW 20 MIN: CPT | Performed by: STUDENT IN AN ORGANIZED HEALTH CARE EDUCATION/TRAINING PROGRAM

## 2024-10-04 RX ORDER — IBUPROFEN 800 MG/1
800 TABLET ORAL EVERY 8 HOURS PRN
Qty: 21 TABLET | Refills: 0 | Status: SHIPPED | OUTPATIENT
Start: 2024-10-04 | End: 2024-10-11

## 2024-10-04 RX ORDER — FLUTICASONE PROPIONATE 50 MCG
1 SPRAY, SUSPENSION (ML) NASAL 2 TIMES DAILY
COMMUNITY
Start: 2024-10-02 | End: 2024-10-12

## 2024-10-04 RX ORDER — PSEUDOEPHEDRINE HCL 30 MG
30 TABLET ORAL EVERY 6 HOURS PRN
Qty: 30 TABLET | Refills: 0 | Status: SHIPPED | OUTPATIENT
Start: 2024-10-04 | End: 2024-10-12

## 2024-10-04 RX ORDER — TRIAMCINOLONE ACETONIDE 1 MG/G
PASTE DENTAL
COMMUNITY
Start: 2024-10-02 | End: 2024-10-04 | Stop reason: ALTCHOICE

## 2024-10-04 ASSESSMENT — ENCOUNTER SYMPTOMS
SHORTNESS OF BREATH: 0
FATIGUE: 1
HEADACHES: 0
RHINORRHEA: 1
SINUS PRESSURE: 1
SORE THROAT: 1
COUGH: 0
FEVER: 1
CHILLS: 0
SINUS PAIN: 1

## 2024-10-04 NOTE — PROGRESS NOTES
Assessment/Plan   Problem List Items Addressed This Visit    None  Visit Diagnoses         Codes    Acute non-recurrent maxillary sinusitis    -  Primary J01.00    Relevant Medications    pseudoephedrine (Sudafed) 30 mg tablet    ibuprofen 800 mg tablet            Subjective   Patient ID: Tracie Malagon is a 34 y.o. female who presents for Earache and Sinusitis (Sinus drainage, that is yellow and thick, developed ear pain, went to urgent care 2 days ago and was tested for strep was neg. Has not tested for COVID because of no cough.).  Earache   Associated symptoms include rhinorrhea and a sore throat. Pertinent negatives include no coughing or headaches.   Sinusitis  Associated symptoms include congestion, ear pain, sinus pressure and a sore throat. Pertinent negatives include no chills, coughing, headaches or shortness of breath.     Patient has been feeling unwell for 4 days. She went to  two days ago, was tested for strep and was negative. She has not tested for COVID.   She has been having sinus pain, sinus pressure maxillary area, R sided ear pain, muffled hearing, congestion, post nasal drip.   Symptoms seem to be worsening.  She has been taking flonase and tylenol without relief.     Review of Systems   Constitutional:  Positive for fatigue and fever. Negative for chills.   HENT:  Positive for congestion, ear pain, postnasal drip, rhinorrhea, sinus pressure, sinus pain and sore throat.    Respiratory:  Negative for cough and shortness of breath.    Neurological:  Negative for headaches.       Objective   Physical Exam  Constitutional:       General: She is not in acute distress.     Appearance: Normal appearance. She is not ill-appearing.   HENT:      Head: Normocephalic and atraumatic.      Right Ear: Tympanic membrane, ear canal and external ear normal.      Left Ear: Tympanic membrane, ear canal and external ear normal.      Mouth/Throat:      Mouth: Mucous membranes are moist.      Pharynx:  Oropharynx is clear. Posterior oropharyngeal erythema present. No oropharyngeal exudate.   Eyes:      Extraocular Movements: Extraocular movements intact.   Cardiovascular:      Rate and Rhythm: Normal rate and regular rhythm.   Pulmonary:      Effort: Pulmonary effort is normal. No respiratory distress.      Breath sounds: Normal breath sounds. No stridor. No wheezing, rhonchi or rales.   Neurological:      Mental Status: She is alert.              Nicholas Lutz MD 10/04/24 10:18 AM

## 2024-10-08 ENCOUNTER — APPOINTMENT (OUTPATIENT)
Dept: PHYSICAL THERAPY | Facility: CLINIC | Age: 34
End: 2024-10-08
Payer: COMMERCIAL

## 2024-10-08 ENCOUNTER — PATIENT MESSAGE (OUTPATIENT)
Dept: PRIMARY CARE | Facility: CLINIC | Age: 34
End: 2024-10-08
Payer: COMMERCIAL

## 2024-10-08 DIAGNOSIS — J01.00 ACUTE NON-RECURRENT MAXILLARY SINUSITIS: Primary | ICD-10-CM

## 2024-10-08 RX ORDER — AMOXICILLIN AND CLAVULANATE POTASSIUM 875; 125 MG/1; MG/1
875 TABLET, FILM COATED ORAL 2 TIMES DAILY
Qty: 20 TABLET | Refills: 0 | Status: SHIPPED | OUTPATIENT
Start: 2024-10-08 | End: 2024-10-18

## 2024-10-15 ENCOUNTER — TREATMENT (OUTPATIENT)
Dept: PHYSICAL THERAPY | Facility: CLINIC | Age: 34
End: 2024-10-15
Payer: COMMERCIAL

## 2024-10-15 DIAGNOSIS — M62.89 PELVIC FLOOR DYSFUNCTION: ICD-10-CM

## 2024-10-15 PROCEDURE — 97110 THERAPEUTIC EXERCISES: CPT | Mod: GP

## 2024-10-15 NOTE — PROGRESS NOTES
"PHYSICAL THERAPY   TREATMENT NOTE    Patient Name:  Tracie Malagon   Patient MRN: [id number]  Date: 10/15/24    Time Calculation  Start Time: 0936  Stop Time: 1015  Time Calculation (min): 39 min    Insurance:  Visit number: 3  Insurance Type: C[employer]ource  Approved # of visits: 30  Authorization Needed: yes  Cert Date Ends On: 12-31-24    General   Reason for visit: post op weakness  Referred by: Dr. Srivastava     Therapy diagnoses:   1. Pelvic floor dysfunction  Follow Up In Physical Therapy           Assessment:    Pt with increased L sided fatigue and difficulty performing hip abduction exercises   Pain levels were slightly increased at the end of the treatment.    Plan:  1) Email update  2) Look into OMGyes and other sexual exploration sites   3) May look into vaginal wand   4) Continue gym program and increase hip abduction     Subjective    Pain with urination slightly getting better. She has been sick for the past week. She is compliant with exercise. She went to [place] and was walking a lot and noticing decreased endurance   Pain (0-10): 0  Pt primary goal: optimize health, \"acquire a new normal\", improve intercourse     Precautions  PMH: adenomyosis, migraines  Fall Risk: no     Objective    Treatment Performed:   Therapeutic Exercise:  39 minutes  Hamstring set R/L with 5 second hold x 15  Attempted bridge on ball   Prone hs lift x10 LLE with 5 second rest   Hooklying TA small march/DK lift 2 x 10   Bunny stretch 30 seconds x 3 R/L  Sidelying hip abduction R/L x 10 with 10 second hold   Self Care:   minutes    Manual Therapy:   minutes    Neuromuscular Re-education:   minutes    Gait Training:    minutes    Modalities: minutes      "

## 2024-10-16 PROBLEM — Z13.71 BRCA NEGATIVE: Status: ACTIVE | Noted: 2024-10-16

## 2024-10-16 NOTE — PROGRESS NOTES
History of Present Illness:  Tracie Malagon is a 34 y.o. female seen in virtual follow-up in the [facility]. She was last seen on 2024, at which time she chose to pursue testing for hereditary cancer as part of a 71-gene multigene cancer panel due to her family history of cancer. Her results returned, and are negative. She has access to her results via her  [email or web address].     Family history (as previously noted):     -Patient, no history of cancer;  -Mother, skin cancer, alive at 57;  -Maternal aunt, breast cancer in her mid 40s, alive at 60;  -Maternal grandfather, bladder cancer, alive at 81;  -Maternal 1st cousins once removed x3 [place] [facility] [place] (two living, one also had lung cancer and is now );  -Maternal 1st cousin once removed x1 (through [facility] [place] liver cancer;  -Maternal grandmother, breast cancer in her late 30s,  at 64;  -Maternal great uncle (M's brother), breast cancer in his 50s, ;  -Father, no history of cancer,  of sudden cardiac arrest at 44;  -Paternal grandmother, breast cancer at unknown age,  in her late 70s.     Ancestry is  based on ancestry testing. There is no known Ashkenazi Yazidism ancestry or consanguinity.    Genetic test results:  Negative 71-gene [facility] + RNAinsight panel, report date 24.    Genes Analyzed (71 total): AIP, ALK, APC, NADEEM, BAP1, BARD1, BMPR1A, BRCA1, BRCA2, BRIP1, CDC73, CDH1, CDK4, CDKN1B, CDKN2A, CHEK2, DICER1, FH, FLCN, KIF1B, LZTR1, MAX, MEN1, MET, MLH1, MSH2, MSH6, MUTYH, NF1, NF2, NTHL1, PALB2, PHOX2B, PMS2, POT1, HJWSP2L, PTCH1, PTEN, RAD51C, RAD51D, RB1, RET, SDHA, SDHAF2, SDHB, SDHC, SDHD, SMAD4, SMARCA4, SMARCB1, SMARCE1, STK11, SUFU, WSDI853, TP53, TSC1, TSC2 and VHL (sequencing and deletion/duplication); AXIN2, CTNNA1, EGFR, EGLN1, HOXB13, KIT, MITF, MSH3, PDGFRA, POLD1 and POLE (sequencing only); EPCAM and GREM1 (deletion/duplication only). RNA data is routinely analyzed  for use in variant interpretation for all genes.    Results are summarized at the bottom of this note, as well as attached to the encounter.    Genetic counseling:  Ms. Malagon's results were NEGATIVE, meaning that no disease causing mutations were identified. A genetic cause for her family history of cancer has not been identified.    One reason Ms. Malagon underwent genetic testing was to better understand her risk to develop breast cancer, to help determine if further breast screening or surgery is indicated. Because their results were negative, Ms. Malagon does not have an identifiable genetic risk factor that places her at an increased risk to develop breast cancer. However, she is still at increased risk based on the family history. She was encouraged to follow-up with Mary Sampson in the  High Risk Breast program for continued high-risk breast care.    It was previously discussed that women with BRCA1 and BRCA2 mutations have an increased risk for ovarian cancer. With negative test results and no family history of ovarian cancer, Ms. Malagon is not considered at increased risk for this cancer type from a genetic standpoint. Prophylactic surgery is not indicated from a genetic standpoint.    We also discussed that Ms. Malagon should follow their primary care providers' recommendations for all other age-related cancer screenings, such as colon cancer screening, etc.    Ms. Malagon's maternal relatives are still candidates for genetic testing themselves, as the family history of breast cancer remains unexplained.  There may be a gene mutation, like a BRCA1 or BRCA2 mutation, in the family that Ms. Malagon did not inherit. If her relatives are local, we would be glad to see them here at .  They can call [phone], option 1, to schedule an appointment.  If they live outside the [facility], their relatives can find a genetics specialist in their area by visiting the [employer]  "website at: <http://www.nsgc.org/> under \"Find a Genetic Counselor,\" with \"Cancer\" specified under special area.     Our understanding of genetic contribution to cancer diagnoses is always evolving, so there may be additional testing recommended in the future. Ms. Malagon was asked to keep us apprised as to any changes to their personal and/or family history of cancer, and to contact us in 2-3 years to determine if there have been any changes since our discussion today.    Briseyda Russ MS, [facility] [place]  [facility]  [phone]    Time spent with patient:  8 minutes (8:31-8:39 am), virtual visit.    Virtual or Telephone Consent    An interactive audio and video telecommunication system which permits real time communications between the patient (at the originating site) and provider (at the distant site) was utilized to provide this telehealth service.   Verbal consent was requested and obtained from Tracie Malagon on this date, 09/25/2024 for a telehealth visit.         "

## 2024-10-22 ENCOUNTER — APPOINTMENT (OUTPATIENT)
Dept: PHYSICAL THERAPY | Facility: CLINIC | Age: 34
End: 2024-10-22
Payer: COMMERCIAL

## 2024-10-25 ENCOUNTER — LAB (OUTPATIENT)
Dept: LAB | Facility: LAB | Age: 34
End: 2024-10-25
Payer: COMMERCIAL

## 2024-10-25 DIAGNOSIS — E03.8 SUBCLINICAL HYPOTHYROIDISM: Primary | ICD-10-CM

## 2024-10-25 DIAGNOSIS — E03.8 SUBCLINICAL HYPOTHYROIDISM: ICD-10-CM

## 2024-10-25 LAB — TSH SERPL-ACNC: 1.87 MIU/L (ref 0.44–3.98)

## 2024-10-25 PROCEDURE — 36415 COLL VENOUS BLD VENIPUNCTURE: CPT

## 2024-10-25 PROCEDURE — 84443 ASSAY THYROID STIM HORMONE: CPT

## 2024-10-25 RX ORDER — LEVOTHYROXINE SODIUM 50 UG/1
TABLET ORAL
Qty: 30 TABLET | Refills: 5 | Status: SHIPPED | OUTPATIENT
Start: 2024-10-25

## 2024-10-30 ENCOUNTER — APPOINTMENT (OUTPATIENT)
Dept: SURGICAL ONCOLOGY | Facility: CLINIC | Age: 34
End: 2024-10-30
Payer: COMMERCIAL

## 2024-12-06 ENCOUNTER — DOCUMENTATION (OUTPATIENT)
Dept: PHYSICAL THERAPY | Facility: CLINIC | Age: 34
End: 2024-12-06
Payer: COMMERCIAL

## 2024-12-06 NOTE — PROGRESS NOTES
Physical Therapy    Discharge Summary    Name: Tracie Malagon  MRN: [id number]  : 1990  Date: 24    Discharge Summary: PT    Discharge Information: Date of discharge 24, Date of last visit 10-15-24, Date of evaluation 24, Number of attended visits 3, Referred by Dr. Srivastava, and Referred for pelvic pain     Discharge Status: unknown    Rehab Discharge Reason: Failed to schedule and/or keep follow-up appointment(s)

## 2024-12-11 ENCOUNTER — PATIENT MESSAGE (OUTPATIENT)
Dept: ENDOCRINOLOGY | Facility: CLINIC | Age: 34
End: 2024-12-11
Payer: COMMERCIAL

## 2024-12-11 DIAGNOSIS — E03.8 SUBCLINICAL HYPOTHYROIDISM: ICD-10-CM

## 2024-12-11 RX ORDER — LEVOTHYROXINE SODIUM 50 UG/1
TABLET ORAL
Qty: 30 TABLET | Refills: 5 | Status: SHIPPED | OUTPATIENT
Start: 2024-12-11

## 2024-12-17 RX ORDER — LEVOTHYROXINE SODIUM 50 UG/1
TABLET ORAL
Qty: 30 TABLET | Refills: 5 | Status: SHIPPED | OUTPATIENT
Start: 2024-12-17

## 2025-01-09 ENCOUNTER — APPOINTMENT (OUTPATIENT)
Dept: ENDOCRINOLOGY | Facility: CLINIC | Age: 35
End: 2025-01-09
Payer: COMMERCIAL

## (undated) DEVICE — TROCAR SYSTEM, BALLOON, KII GELPORT, 12 X 100MM

## (undated) DEVICE — Device

## (undated) DEVICE — POSITIONING, THE PINK PAD, PIGAZZI SYSTEM

## (undated) DEVICE — REST, HEAD, BAGEL, 9 IN

## (undated) DEVICE — OCCLUDER, COLPO-PNEUMO

## (undated) DEVICE — CAUTERY, PENCIL, PUSH BUTTON, SMOKE EVAC, 70MM

## (undated) DEVICE — DRAPE, PAD, PREP, W/ 9 IN CUFF, 24 X 41, LF, NS

## (undated) DEVICE — LIGASURE, V SEALER/DIVIDER  5MM BLUNT TIP

## (undated) DEVICE — PREP TRAY, SKIN, DRY, W/GLOVES

## (undated) DEVICE — SUTURE, VICRYL, 0, 3 X 27 IN, CT-1, VIOLET

## (undated) DEVICE — TUBE, SALEM SUMP, 16 FR X 48IN, ENFIT

## (undated) DEVICE — SYSTEM, FIOS FIRST ENTRY, 5 X 100MM, KII ADVANCED FIXATION

## (undated) DEVICE — TUBING, SUCTION, CONNECTING, STERILE 0.25 X 120 IN., LF

## (undated) DEVICE — TUBING SET, TRI-LUMEN, FILTERED, F/AIRSEAL

## (undated) DEVICE — SYRINGE, LUER LOCK, 12ML

## (undated) DEVICE — RETRACTOR, CERVICAL CUP, VCARE, STANDARD

## (undated) DEVICE — HOLSTER, JET SAFETY

## (undated) DEVICE — COVER, CART, 45 X 27 X 48 IN, CLEAR

## (undated) DEVICE — SYRINGE, 60 CC, LUER LOCK, MONOJECT, W/O CAP, LF

## (undated) DEVICE — COVER, MAYO STAND, W/PAD, 23 IN, DISPOSABLE, PLASTIC, LF, STERILE

## (undated) DEVICE — IRRIGATION SET, CYSTOSCOPY, F/CONSTANT/INTERMITTENT, 8 GTT/CC, 77 IN

## (undated) DEVICE — SUTURE, VICRYL, 0, 27 IN, UR-6, VIOLET

## (undated) DEVICE — SYRINGE, 60 CC, IRRIGATION, BULB, CONTRO-BULB, PAPER POUCH

## (undated) DEVICE — DRAPE, LEGGINGS, 48 X 31 IN, STERILE, LF

## (undated) DEVICE — GOWN, SURGICAL, SMARTGOWN, XLARGE, STERILE

## (undated) DEVICE — PROTECTOR, NERVE, ULNAR, PINK

## (undated) DEVICE — SUTURE, VICRYL, 4-0, 18 IN, UNDYED BR PS-2

## (undated) DEVICE — DRESSING, ADHESIVE, ISLAND, TELFA, 2 X 3.75 IN, LF

## (undated) DEVICE — COVER, TABLE, 44 X 75 IN, DISPOSABLE, LF, STERILE

## (undated) DEVICE — BOWL, BASIN, 32 OZ, STERILE

## (undated) DEVICE — MANIFOLD, 4 PORT NEPTUNE STANDARD